# Patient Record
Sex: MALE | Race: WHITE | Employment: FULL TIME | URBAN - METROPOLITAN AREA
[De-identification: names, ages, dates, MRNs, and addresses within clinical notes are randomized per-mention and may not be internally consistent; named-entity substitution may affect disease eponyms.]

---

## 2018-11-13 ENCOUNTER — HOSPITAL ENCOUNTER (EMERGENCY)
Age: 38
Discharge: HOME | End: 2018-11-13
Payer: COMMERCIAL

## 2018-11-13 VITALS
DIASTOLIC BLOOD PRESSURE: 82 MMHG | HEART RATE: 77 BPM | BODY MASS INDEX: 31.5 KG/M2 | HEIGHT: 70 IN | SYSTOLIC BLOOD PRESSURE: 130 MMHG | TEMPERATURE: 98.7 F | WEIGHT: 220 LBS | RESPIRATION RATE: 17 BRPM | OXYGEN SATURATION: 97 %

## 2018-11-13 DIAGNOSIS — K64.4 EXTERNAL HEMORRHOID: Primary | ICD-10-CM

## 2018-11-13 PROCEDURE — 99282 EMERGENCY DEPT VISIT SF MDM: CPT

## 2018-11-13 RX ORDER — DOCUSATE SODIUM 100 MG/1
100 CAPSULE, LIQUID FILLED ORAL 2 TIMES DAILY
Qty: 30 CAPSULE | Refills: 0 | Status: SHIPPED | OUTPATIENT
Start: 2018-11-13 | End: 2020-03-04

## 2018-11-13 ASSESSMENT — PAIN DESCRIPTION - LOCATION: LOCATION: RECTUM

## 2018-11-13 ASSESSMENT — PAIN DESCRIPTION - PAIN TYPE: TYPE: ACUTE PAIN

## 2018-11-13 ASSESSMENT — ENCOUNTER SYMPTOMS
COUGH: 0
SHORTNESS OF BREATH: 0
ANAL BLEEDING: 1

## 2018-11-13 ASSESSMENT — PAIN SCALES - GENERAL: PAINLEVEL_OUTOF10: 6

## 2018-11-13 ASSESSMENT — PAIN DESCRIPTION - DESCRIPTORS: DESCRIPTORS: CONSTANT;THROBBING

## 2018-11-13 NOTE — ED PROVIDER NOTES
[facility]  eMERGENCY dEPARTMENT eNCOUnter      Pt Name: Mary García XHY:[id number]  [place] 1980  Date of evaluation: 11/13/2018  Provider: REED Fonseca CNP      Chief Complaint:    Chief Complaint   Patient presents with    Hemorrhoids     Pt  c/o pain and bleeding from the rectum that started on Saturday. Pt states \"I feel feel something on my rectum and I think its a hemorroid. \"       Nursing Notes, Past Medical Hx, Past Surgical Hx, Social Hx, Allergies, and Family Hx were all reviewed and agreed with or any disagreements were addressed in the HPI.    HPI:  (Location, Duration, Timing, Severity,Quality, Assoc Sx, Context, Modifying factors)  This is a  41 y.o. male who presents to the emergency department with complaints of rectal pain after bowel movement on Saturday. Since that time he has had a tender lump to his rectum. He reports that there is bright red blood on the toilet tissue after he wipes. He does have increased pain with bowel movements. He denies any constipation or diarrhea. He denies any abdominal pain. No fevers or chills. PastMedical/Surgical History:      Diagnosis Date    Kidney stones      History reviewed. No pertinent surgical history. Medications:  Previous Medications    METHADONE 10 MG/5ML SOLUTION    Take 90 mg by mouth once. Review of Systems:  Review of Systems   Constitutional: Negative for chills and fever. HENT: Negative. Respiratory: Negative for cough and shortness of breath. Cardiovascular: Negative for chest pain. Gastrointestinal: Positive for anal bleeding. Genitourinary: Negative. Musculoskeletal: Negative. Neurological: Negative. Psychiatric/Behavioral: Negative. Positives and Pertinent negatives as per HPI. Except as noted above in the ROS, problem specific ROS was completed and is negative.     Physical Exam:  Physical Exam

## 2019-08-07 ENCOUNTER — HOSPITAL ENCOUNTER (OUTPATIENT)
Dept: ULTRASOUND IMAGING | Age: 39
Discharge: HOME | End: 2019-08-07
Payer: COMMERCIAL

## 2019-08-07 DIAGNOSIS — B19.20 HEPATITIS C VIRUS INFECTION WITHOUT HEPATIC COMA, UNSPECIFIED CHRONICITY: ICD-10-CM

## 2019-08-07 PROCEDURE — 76981 USE PARENCHYMA: CPT

## 2019-08-07 PROCEDURE — 76705 ECHO EXAM OF ABDOMEN: CPT

## 2019-09-06 NOTE — PROGRESS NOTES
[address] [facility]   Cardiovascular Evaluation    PATIENT: Alecia Camacho  DATE: 2019  MRN: [id number]  CSN: [id number]  : 1980      Primary Care Doctor: No primary care provider on file. Reason for evaluation:   New Patient; Abnormal Test Results (ekg); and Numbness (both hands)      Subjective:   History of present illness on initial date of evaluation:   Alecia Camacho is a 42 y.o. patient who presents referred by Dr. Carlos Huang for abnormal EKG. He states he went to see his doctor a month ago due to hands/arms becoming numbs and feeling \"off\". He denies any family history of cardiac disease. He states he has never had any cardiac issues. Patient denies chest pain, sob, palpitations, dizziness or syncope. Patient Active Problem List   Diagnosis    Abnormal EKG         Past Medical History:   has a past medical history of Kidney stones. Surgical History:   has no past surgical history on file. Social History:   reports that he has been smoking cigarettes. He has been smoking about 1.00 pack per day. His smokeless tobacco use includes chew. He reports that he does not drink alcohol or use drugs. Family History:  No evidence for sudden cardiac death or premature CAD    Home Medications:  Reviewed and are listed in nursing record. and/or listed below  Current Outpatient Medications   Medication Sig Dispense Refill    methadone 10 MG/5ML solution Take 90 mg by mouth once.  hydrocortisone (ANUSOL-HC) 2.5 % rectal cream Place rectally 2 times daily. (Patient not taking: Reported on 2019) 1 Tube 0    docusate sodium (COLACE) 100 MG capsule Take 1 capsule by mouth 2 times daily (Patient not taking: Reported on 2019) 30 capsule 0     No current facility-administered medications for this visit. Allergies:  Patient has no known allergies. Review of Systems:   A 14 point review of symptoms completed.  Pertinent positives identified in the HPI, all 2014    AST 37 2014    ALKPHOS 83 2014    BILITOT 0.5 2014     No results found for: LABA1C  Lipids:       No results found for: TRIG       No results found for: HDL       No results found for: LDLCALC       No results found for: LABVLDL      CARDIAC DATA   EK2019 Sinus bradycardia at 56    ECHO/MUGA:    STRESS TEST:    CARDIAC CATH:    VASCULAR/OTHER IMAGING:      Assessment and Plan   Charity Jacobsen is a 42 y.o. male who presents today for the following problems:      1. Abnormal ECG: reason for referral    MD Plan:  1. PT ECG is normal except for sinus shane which is likely physiologic for pt. No s/s of chronotropic incompetence. No FMHx. - no further testing, no cardiac symptoms. - pt to call with any change in symptoms         Patient Active Problem List   Diagnosis    Abnormal EKG       Patient Plan:  1. No cardiac testing warranted        It is a pleasure to assist in the care of Charity Jacobsen. Please call with any questions. This note was scribed in the presence of Hardeep Arguello MD by Halle Villalobos RN.  Kenyetta Méndez MD, personally performed the services described in this documentation as scribed by the above signed scribe in my presence, and it is both accurate and complete to the best of our ability and knowledge. I agree with the details independently gathered by my clinical support staff, while the remaining scribed note accurately describes my personal service to the patient. The above RN is working as a scribe for and in the presence of myself . Working as a scribe, the RN may have prepopulated components of this note with my historical intellectual property under my direct supervision. Any additions to this intellectual property were performed at my direction. Furthermore, the content and accuracy of this note have been reviewed by me to the best of my ability.        Olga Calderon MD, [address] [facility] Cardiologist  [facility]  ([phone]

## 2019-09-09 ENCOUNTER — OFFICE VISIT (OUTPATIENT)
Dept: CARDIOLOGY CLINIC | Age: 39
End: 2019-09-09
Payer: COMMERCIAL

## 2019-09-09 VITALS
OXYGEN SATURATION: 96 % | DIASTOLIC BLOOD PRESSURE: 80 MMHG | BODY MASS INDEX: 31.92 KG/M2 | HEART RATE: 66 BPM | HEIGHT: 70 IN | SYSTOLIC BLOOD PRESSURE: 100 MMHG | WEIGHT: 223 LBS

## 2019-09-09 DIAGNOSIS — R94.31 ABNORMAL EKG: ICD-10-CM

## 2019-09-09 DIAGNOSIS — Z76.89 ESTABLISHING CARE WITH NEW DOCTOR, ENCOUNTER FOR: Primary | ICD-10-CM

## 2019-09-09 PROCEDURE — 93000 ELECTROCARDIOGRAM COMPLETE: CPT | Performed by: INTERNAL MEDICINE

## 2019-09-09 PROCEDURE — 99243 OFF/OP CNSLTJ NEW/EST LOW 30: CPT | Performed by: INTERNAL MEDICINE

## 2020-03-04 ENCOUNTER — HOSPITAL ENCOUNTER (EMERGENCY)
Age: 40
Discharge: HOME | End: 2020-03-04
Payer: COMMERCIAL

## 2020-03-04 ENCOUNTER — APPOINTMENT (OUTPATIENT)
Dept: CT IMAGING | Age: 40
End: 2020-03-04
Payer: COMMERCIAL

## 2020-03-04 VITALS
HEART RATE: 65 BPM | DIASTOLIC BLOOD PRESSURE: 77 MMHG | SYSTOLIC BLOOD PRESSURE: 123 MMHG | RESPIRATION RATE: 18 BRPM | OXYGEN SATURATION: 99 % | TEMPERATURE: 97.7 F | WEIGHT: 220 LBS | HEIGHT: 69 IN | BODY MASS INDEX: 32.58 KG/M2

## 2020-03-04 LAB
A/G RATIO: 1 (ref 1.1–2.2)
ALBUMIN SERPL-MCNC: 3.6 G/DL (ref 3.4–5)
ALP BLD-CCNC: 143 U/L (ref 40–129)
ALT SERPL-CCNC: 69 U/L (ref 10–40)
ANION GAP SERPL CALCULATED.3IONS-SCNC: 9 MMOL/L (ref 3–16)
AST SERPL-CCNC: 76 U/L (ref 15–37)
BASOPHILS ABSOLUTE: 0.1 K/UL (ref 0–0.2)
BASOPHILS RELATIVE PERCENT: 0.7 %
BILIRUB SERPL-MCNC: 0.5 MG/DL (ref 0–1)
BILIRUBIN URINE: NEGATIVE
BLOOD, URINE: NEGATIVE
BUN BLDV-MCNC: 15 MG/DL (ref 7–20)
CALCIUM SERPL-MCNC: 8.5 MG/DL (ref 8.3–10.6)
CHLORIDE BLD-SCNC: 104 MMOL/L (ref 99–110)
CLARITY: CLEAR
CO2: 23 MMOL/L (ref 21–32)
COLOR: YELLOW
CREAT SERPL-MCNC: 0.7 MG/DL (ref 0.9–1.3)
EOSINOPHILS ABSOLUTE: 0 K/UL (ref 0–0.6)
EOSINOPHILS RELATIVE PERCENT: 0.3 %
GFR AFRICAN AMERICAN: >60
GFR NON-AFRICAN AMERICAN: >60
GLOBULIN: 3.6 G/DL
GLUCOSE BLD-MCNC: 101 MG/DL (ref 70–99)
GLUCOSE URINE: NEGATIVE MG/DL
HCT VFR BLD CALC: 44.6 % (ref 40.5–52.5)
HEMOGLOBIN: 14.7 G/DL (ref 13.5–17.5)
KETONES, URINE: NEGATIVE MG/DL
LEUKOCYTE ESTERASE, URINE: NEGATIVE
LIPASE: 29 U/L (ref 13–60)
LYMPHOCYTES ABSOLUTE: 1 K/UL (ref 1–5.1)
LYMPHOCYTES RELATIVE PERCENT: 10.9 %
MCH RBC QN AUTO: 30.3 PG (ref 26–34)
MCHC RBC AUTO-ENTMCNC: 33.1 G/DL (ref 31–36)
MCV RBC AUTO: 91.6 FL (ref 80–100)
MICROSCOPIC EXAMINATION: ABNORMAL
MONOCYTES ABSOLUTE: 0.4 K/UL (ref 0–1.3)
MONOCYTES RELATIVE PERCENT: 4.5 %
NEUTROPHILS ABSOLUTE: 7.6 K/UL (ref 1.7–7.7)
NEUTROPHILS RELATIVE PERCENT: 83.6 %
NITRITE, URINE: NEGATIVE
PDW BLD-RTO: 14 % (ref 12.4–15.4)
PH UA: 8.5 (ref 5–8)
PLATELET # BLD: 140 K/UL (ref 135–450)
PMV BLD AUTO: 8.8 FL (ref 5–10.5)
POTASSIUM REFLEX MAGNESIUM: 4.2 MMOL/L (ref 3.5–5.1)
PROTEIN UA: NEGATIVE MG/DL
RBC # BLD: 4.86 M/UL (ref 4.2–5.9)
SODIUM BLD-SCNC: 136 MMOL/L (ref 136–145)
SPECIFIC GRAVITY UA: 1.02 (ref 1–1.03)
TOTAL PROTEIN: 7.2 G/DL (ref 6.4–8.2)
URINE REFLEX TO CULTURE: ABNORMAL
URINE TYPE: ABNORMAL
UROBILINOGEN, URINE: 1 E.U./DL
WBC # BLD: 9 K/UL (ref 4–11)

## 2020-03-04 PROCEDURE — 80053 COMPREHEN METABOLIC PANEL: CPT

## 2020-03-04 PROCEDURE — 96375 TX/PRO/DX INJ NEW DRUG ADDON: CPT

## 2020-03-04 PROCEDURE — 85025 COMPLETE CBC W/AUTO DIFF WBC: CPT

## 2020-03-04 PROCEDURE — 99284 EMERGENCY DEPT VISIT MOD MDM: CPT

## 2020-03-04 PROCEDURE — 81003 URINALYSIS AUTO W/O SCOPE: CPT

## 2020-03-04 PROCEDURE — 96374 THER/PROPH/DIAG INJ IV PUSH: CPT

## 2020-03-04 PROCEDURE — 6370000000 HC RX 637 (ALT 250 FOR IP): Performed by: PHYSICIAN ASSISTANT

## 2020-03-04 PROCEDURE — 6360000002 HC RX W HCPCS: Performed by: PHYSICIAN ASSISTANT

## 2020-03-04 PROCEDURE — 2500000003 HC RX 250 WO HCPCS: Performed by: PHYSICIAN ASSISTANT

## 2020-03-04 PROCEDURE — 83690 ASSAY OF LIPASE: CPT

## 2020-03-04 PROCEDURE — 2580000003 HC RX 258: Performed by: PHYSICIAN ASSISTANT

## 2020-03-04 RX ORDER — ONDANSETRON 2 MG/ML
4 INJECTION INTRAMUSCULAR; INTRAVENOUS ONCE
Status: COMPLETED | OUTPATIENT
Start: 2020-03-04 | End: 2020-03-04

## 2020-03-04 RX ORDER — ONDANSETRON 4 MG/1
4 TABLET, ORALLY DISINTEGRATING ORAL EVERY 8 HOURS PRN
Qty: 20 TABLET | Refills: 0 | Status: SHIPPED | OUTPATIENT
Start: 2020-03-04 | End: 2021-08-06

## 2020-03-04 RX ORDER — 0.9 % SODIUM CHLORIDE 0.9 %
1000 INTRAVENOUS SOLUTION INTRAVENOUS ONCE
Status: COMPLETED | OUTPATIENT
Start: 2020-03-04 | End: 2020-03-04

## 2020-03-04 RX ORDER — ACETAMINOPHEN 500 MG
1000 TABLET ORAL ONCE
Status: COMPLETED | OUTPATIENT
Start: 2020-03-04 | End: 2020-03-04

## 2020-03-04 RX ADMIN — FAMOTIDINE 20 MG: 10 INJECTION INTRAVENOUS at 12:19

## 2020-03-04 RX ADMIN — SODIUM CHLORIDE 1000 ML: 9 INJECTION, SOLUTION INTRAVENOUS at 12:17

## 2020-03-04 RX ADMIN — ONDANSETRON HYDROCHLORIDE 4 MG: 2 INJECTION, SOLUTION INTRAMUSCULAR; INTRAVENOUS at 12:17

## 2020-03-04 RX ADMIN — ACETAMINOPHEN 1000 MG: 500 TABLET ORAL at 13:01

## 2020-03-04 ASSESSMENT — PAIN SCALES - GENERAL: PAINLEVEL_OUTOF10: 8

## 2020-03-10 ASSESSMENT — ENCOUNTER SYMPTOMS
DIARRHEA: 1
COUGH: 0
VOMITING: 1
SHORTNESS OF BREATH: 0
NAUSEA: 1

## 2020-03-11 NOTE — ED PROVIDER NOTES
[address] [facility]  EMERGENCY DEPARTMENT ENCOUNTER        Pt Name: Yuliana Olivarez  MRN: [id number]  [place] 1980  Date of evaluation: 3/4/2020  Provider: Reji Burt PA-C  PCP: No primary care provider on file. ED Attending: No att. providers found      This patient was not seen and evaluated by the attending physician No att. providers found. I have independently evaluated this patient. CHIEF COMPLAINT       Chief Complaint   Patient presents with    Nausea     Patient arrived via EMS, c/o N/V since 8 am. denies fever    Emesis       HISTORY OF PRESENT ILLNESS   (Location/Symptom, Timing/Onset, Context/Setting, Quality, Duration, Modifying Factors, Severity)  Note limiting factors. Yuliana Olivarez is a 42 y.o. male for evaluation via EMS of sudden onset of nausea and vomiting and diarrhea at 8 AM this morning patient denies any fever. Denies any abdominal pain. Patient is negative for sick contacts. Patient denies any history of abdominal surgery. He has a history of kidney stones. He denies any hematuria. Patient denies any treatment prior to arrival. +fighiting dental infection. Taking abx for this. Been on abx for 3 days. 8/10 throbbing dental pain. Nursing Notes were all reviewed and agreed with or any disagreements were addressed  in the HPI. REVIEW OF SYSTEMS  (2-9 systems for level 4, 10 or more for level 5)     Review of Systems   Constitutional: Negative for chills and fever. Respiratory: Negative for cough and shortness of breath. Cardiovascular: Negative for chest pain and palpitations. Gastrointestinal: Positive for diarrhea, nausea and vomiting. Genitourinary: Negative for difficulty urinating, dysuria and frequency. Musculoskeletal: Negative. Skin: Negative. All other systems reviewed and are negative. Positivesand Pertinent negatives as per HPI.   Except as noted above in the ROS, all other systems were reviewed and negative. PAST MEDICAL HISTORY     Past Medical History:   Diagnosis Date    Kidney stones          SURGICAL HISTORY     No past surgical history on file. CURRENT MEDICATIONS       Discharge Medication List as of 3/4/2020  1:16 PM            ALLERGIES     Patient has no known allergies. FAMILY HISTORY     No family history on file.       SOCIAL HISTORY       Social History     Socioeconomic History    Marital status: Legally      Spouse name: Not on file    Number of children: Not on file    Years of education: Not on file    Highest education level: Not on file   Occupational History    Not on file   Social Needs    Financial resource strain: Not on file    Food insecurity     Worry: Not on file     Inability: Not on file    Transportation needs     Medical: Not on file     Non-medical: Not on file   Tobacco Use    Smoking status: Current Every Day Smoker     Packs/day: 1.00     Types: Cigarettes    Smokeless tobacco: Current User     Types: Chew   Substance and Sexual Activity    Alcohol use: No    Drug use: No     Types: IV     Comment: heroin last used 4/28/15    Sexual activity: Not on file   Lifestyle    Physical activity     Days per week: Not on file     Minutes per session: Not on file    Stress: Not on file   Relationships    Social connections     Talks on phone: Not on file     Gets together: Not on file     Attends Jewish service: Not on file     Active member of club or organization: Not on file     Attends meetings of clubs or organizations: Not on file     Relationship status: Not on file    Intimate partner violence     Fear of current or ex partner: Not on file     Emotionally abused: Not on file     Physically abused: Not on file     Forced sexual activity: Not on file   Other Topics Concern    Not on file   Social History Narrative    Not on file       SCREENINGS     NIH Score       Glascow Karo Coma Scale  Eye Opening: Spontaneous  Best Verbal Response: Oriented  Best Motor Response: Obeys commands  [place] Coma Scale Score: 15    [facility]     Heart Score         PHYSICAL EXAM    (up to 7 for level 4, 8 ormore for level 5)     ED Triage Vitals [03/04/20 1109]   BP Temp Temp Source Pulse Resp SpO2 Height Weight   127/70 97.7 °F (36.5 °C) Oral 54 16 100 % 5' 9\" (1.753 m) 220 lb (99.8 kg)       Physical Exam  Vitals signs and nursing note reviewed. Constitutional:       Appearance: He is well-developed. He is not diaphoretic. HENT:      Head: Normocephalic. Nose: Nose normal.      Mouth/Throat:      Pharynx: No oropharyngeal exudate. Eyes:      General:         Right eye: No discharge. Left eye: No discharge. Conjunctiva/sclera: Conjunctivae normal.      Pupils: Pupils are equal, round, and reactive to light. Neck:      Musculoskeletal: Normal range of motion. Cardiovascular:      Rate and Rhythm: Normal rate and regular rhythm. Heart sounds: Normal heart sounds. No murmur. No friction rub. No gallop. Pulmonary:      Effort: Pulmonary effort is normal. No respiratory distress. Breath sounds: Normal breath sounds. No wheezing. Abdominal:      General: Bowel sounds are normal. There is no distension. Palpations: Abdomen is soft. Tenderness: There is no abdominal tenderness. Musculoskeletal: Normal range of motion. Skin:     General: Skin is warm and dry. Neurological:      Mental Status: He is alert.    Psychiatric:         Behavior: Behavior normal.         DIAGNOSTIC RESULTS   LABS:    Labs Reviewed   COMPREHENSIVE METABOLIC PANEL W/ REFLEX TO MG FOR LOW K - Abnormal; Notable for the following components:       Result Value    Glucose 101 (*)     CREATININE 0.7 (*)     Albumin/Globulin Ratio 1.0 (*)     Alkaline Phosphatase 143 (*)     ALT 69 (*)     AST 76 (*)     All other components within normal limits    Narrative:     Performed at:  [facility] BUN 15 7 - 20 mg/dL    CREATININE 0.7 (L) 0.9 - 1.3 mg/dL    GFR Non-African American >60 >60    GFR African American >60 >60    Calcium 8.5 8.3 - 10.6 mg/dL    Total Protein 7.2 6.4 - 8.2 g/dL    Alb 3.6 3.4 - 5.0 g/dL    Albumin/Globulin Ratio 1.0 (L) 1.1 - 2.2    Total Bilirubin 0.5 0.0 - 1.0 mg/dL    Alkaline Phosphatase 143 (H) 40 - 129 U/L    ALT 69 (H) 10 - 40 U/L    AST 76 (H) 15 - 37 U/L    Globulin 3.6 g/dL   Lipase   Result Value Ref Range    Lipase 29.0 13.0 - 60.0 U/L   Urinalysis Reflex to Culture   Result Value Ref Range    Color, UA Yellow Straw/Yellow    Clarity, UA Clear Clear    Glucose, Ur Negative Negative mg/dL    Bilirubin Urine Negative Negative    Ketones, Urine Negative Negative mg/dL    Specific Gravity, UA 1.020 1.005 - 1.030    Blood, Urine Negative Negative    pH, UA 8.5 (A) 5.0 - 8.0    Protein, UA Negative Negative mg/dL    Urobilinogen, Urine 1.0 <2.0 E.U./dL    Nitrite, Urine Negative Negative    Leukocyte Esterase, Urine Negative Negative    Microscopic Examination Not Indicated     Urine Type NotGiven     Urine Reflex to Culture Not Indicated          I estimate there is LOW risk for ACUTE APPENDICITIS, BOWEL OBSTRUCTION, CHOLECYSTITIS, DIVERTICULITIS, INCARCERATED HERNIA, PANCREATITIS, or PERFORATED BOWEL or ULCER, thus I consider the discharge disposition reasonable. Also, there is no evidence or peritonitis, sepsis, or toxicity. Karey Madrigal and I have discussed the diagnosis and risks, and we agree with discharging home to follow-up with their primary doctor. We also discussed returning to the Emergency Department immediately if new or worsening symptoms occur. We have discussed the symptoms which are most concerning (e.g., bloody stool, fever, changing or worsening pain, vomiting) that necessitate immediate return. FINAL IMPRESSION      1. Nausea and vomiting, intractability of vomiting not specified, unspecified vomiting type    2.  Elevated liver enzymes DISPOSITION/PLAN   DISPOSITION Decision To Discharge 03/04/2020 01:15:45 PM      PATIENT REFERRED TO:  No follow-up provider specified.     DISCHARGE MEDICATIONS:  Discharge Medication List as of 3/4/2020  1:16 PM      START taking these medications    Details   ondansetron (ZOFRAN ODT) 4 MG disintegrating tablet Take 1 tablet by mouth every 8 hours as needed for Nausea, Disp-20 tablet, R-0Print             DISCONTINUED MEDICATIONS:  Discharge Medication List as of 3/4/2020  1:16 PM      STOP taking these medications       methadone 10 MG/5ML solution Comments:   Reason for Stopping:         hydrocortisone (ANUSOL-HC) 2.5 % rectal cream Comments:   Reason for Stopping:         docusate sodium (COLACE) 100 MG capsule Comments:   Reason for Stopping:                      (Please note that portions of this note were completed with a voice recognition program.  Efforts were made to edit the dictations but occasionally words are mis-transcribed.)    Jose Carson PA-C (electronically signed)        Jose Carson PA-C  03/10/20 [id number]

## 2020-07-08 ENCOUNTER — OFFICE VISIT (OUTPATIENT)
Dept: PRIMARY CARE CLINIC | Age: 40
End: 2020-07-08
Payer: COMMERCIAL

## 2020-07-08 PROCEDURE — 99211 OFF/OP EST MAY X REQ PHY/QHP: CPT | Performed by: NURSE PRACTITIONER

## 2020-07-08 NOTE — PROGRESS NOTES
Unique Ceron received a viral test for COVID-19. They were educated on isolation and quarantine as appropriate. For any symptoms, they were directed to seek care from their PCP, given contact information to establish with a doctor, directed to an urgent care or the emergency room.

## 2020-07-09 ENCOUNTER — OFFICE VISIT (OUTPATIENT)
Dept: PRIMARY CARE CLINIC | Age: 40
End: 2020-07-09
Payer: COMMERCIAL

## 2020-07-09 PROCEDURE — 99211 OFF/OP EST MAY X REQ PHY/QHP: CPT | Performed by: NURSE PRACTITIONER

## 2020-07-09 NOTE — PROGRESS NOTES
Isabela Barnes received a viral test for COVID-19. They were educated on isolation and quarantine as appropriate. For any symptoms, they were directed to seek care from their PCP, given contact information to establish with a doctor, directed to an urgent care or the emergency room.

## 2020-07-10 LAB
SARS-COV-2: NOT DETECTED
SOURCE: NORMAL

## 2020-07-13 LAB
SARS-COV-2: NOT DETECTED
SOURCE: NORMAL

## 2021-08-06 ENCOUNTER — HOSPITAL ENCOUNTER (INPATIENT)
Age: 41
LOS: 1 days | Discharge: LEFT BEFORE DISCHARGE | DRG: 549 | End: 2021-08-06
Attending: EMERGENCY MEDICINE | Admitting: INTERNAL MEDICINE
Payer: COMMERCIAL

## 2021-08-06 ENCOUNTER — APPOINTMENT (OUTPATIENT)
Dept: CT IMAGING | Age: 41
DRG: 549 | End: 2021-08-06
Payer: COMMERCIAL

## 2021-08-06 VITALS
HEIGHT: 68 IN | WEIGHT: 167.6 LBS | OXYGEN SATURATION: 94 % | BODY MASS INDEX: 25.4 KG/M2 | RESPIRATION RATE: 16 BRPM | TEMPERATURE: 97.6 F | HEART RATE: 70 BPM | DIASTOLIC BLOOD PRESSURE: 58 MMHG | SYSTOLIC BLOOD PRESSURE: 100 MMHG

## 2021-08-06 DIAGNOSIS — M00.9 SEPTIC ARTHRITIS, DUE TO UNSPECIFIED ORGANISM, SEPTIC ARTHRITIS OF UNSPECIFIED LOCATION (HCC): Primary | ICD-10-CM

## 2021-08-06 DIAGNOSIS — M89.8X1 PAIN OF LEFT CLAVICLE: ICD-10-CM

## 2021-08-06 LAB
A/G RATIO: 0.9 (ref 1.1–2.2)
ALBUMIN SERPL-MCNC: 4.1 G/DL (ref 3.4–5)
ALP BLD-CCNC: 127 U/L (ref 40–129)
ALT SERPL-CCNC: 25 U/L (ref 10–40)
ANION GAP SERPL CALCULATED.3IONS-SCNC: 14 MMOL/L (ref 3–16)
AST SERPL-CCNC: 26 U/L (ref 15–37)
BASOPHILS ABSOLUTE: 0 K/UL (ref 0–0.2)
BASOPHILS RELATIVE PERCENT: 0.4 %
BILIRUB SERPL-MCNC: 0.5 MG/DL (ref 0–1)
BUN BLDV-MCNC: 11 MG/DL (ref 7–20)
CALCIUM SERPL-MCNC: 10.2 MG/DL (ref 8.3–10.6)
CHLORIDE BLD-SCNC: 96 MMOL/L (ref 99–110)
CO2: 28 MMOL/L (ref 21–32)
CREAT SERPL-MCNC: 0.8 MG/DL (ref 0.9–1.3)
EKG ATRIAL RATE: 74 BPM
EKG DIAGNOSIS: NORMAL
EKG P AXIS: 66 DEGREES
EKG P-R INTERVAL: 142 MS
EKG Q-T INTERVAL: 416 MS
EKG QRS DURATION: 88 MS
EKG QTC CALCULATION (BAZETT): 461 MS
EKG R AXIS: 49 DEGREES
EKG T AXIS: 35 DEGREES
EKG VENTRICULAR RATE: 74 BPM
EOSINOPHILS ABSOLUTE: 0.1 K/UL (ref 0–0.6)
EOSINOPHILS RELATIVE PERCENT: 0.9 %
GFR AFRICAN AMERICAN: >60
GFR NON-AFRICAN AMERICAN: >60
GLOBULIN: 4.8 G/DL
GLUCOSE BLD-MCNC: 101 MG/DL (ref 70–99)
HCT VFR BLD CALC: 35.8 % (ref 40.5–52.5)
HEMOGLOBIN: 12.4 G/DL (ref 13.5–17.5)
INR BLD: 1.46 (ref 0.88–1.12)
LACTIC ACID: 0.7 MMOL/L (ref 0.4–2)
LYMPHOCYTES ABSOLUTE: 2.5 K/UL (ref 1–5.1)
LYMPHOCYTES RELATIVE PERCENT: 27 %
MCH RBC QN AUTO: 29.5 PG (ref 26–34)
MCHC RBC AUTO-ENTMCNC: 34.7 G/DL (ref 31–36)
MCV RBC AUTO: 85.1 FL (ref 80–100)
MONOCYTES ABSOLUTE: 0.8 K/UL (ref 0–1.3)
MONOCYTES RELATIVE PERCENT: 9.1 %
NEUTROPHILS ABSOLUTE: 5.7 K/UL (ref 1.7–7.7)
NEUTROPHILS RELATIVE PERCENT: 62.6 %
PDW BLD-RTO: 13.2 % (ref 12.4–15.4)
PLATELET # BLD: 269 K/UL (ref 135–450)
PMV BLD AUTO: 7.7 FL (ref 5–10.5)
POTASSIUM SERPL-SCNC: 3.9 MMOL/L (ref 3.5–5.1)
PROCALCITONIN: 0.13 NG/ML (ref 0–0.15)
PROTHROMBIN TIME: 16.8 SEC (ref 9.9–12.7)
RBC # BLD: 4.21 M/UL (ref 4.2–5.9)
SODIUM BLD-SCNC: 138 MMOL/L (ref 136–145)
TOTAL PROTEIN: 8.9 G/DL (ref 6.4–8.2)
TROPONIN: <0.01 NG/ML
WBC # BLD: 9.2 K/UL (ref 4–11)

## 2021-08-06 PROCEDURE — 6360000002 HC RX W HCPCS: Performed by: NURSE PRACTITIONER

## 2021-08-06 PROCEDURE — 93005 ELECTROCARDIOGRAM TRACING: CPT | Performed by: EMERGENCY MEDICINE

## 2021-08-06 PROCEDURE — 84484 ASSAY OF TROPONIN QUANT: CPT

## 2021-08-06 PROCEDURE — 99285 EMERGENCY DEPT VISIT HI MDM: CPT

## 2021-08-06 PROCEDURE — 85025 COMPLETE CBC W/AUTO DIFF WBC: CPT

## 2021-08-06 PROCEDURE — 87040 BLOOD CULTURE FOR BACTERIA: CPT

## 2021-08-06 PROCEDURE — 93010 ELECTROCARDIOGRAM REPORT: CPT | Performed by: INTERNAL MEDICINE

## 2021-08-06 PROCEDURE — 99254 IP/OBS CNSLTJ NEW/EST MOD 60: CPT | Performed by: NURSE PRACTITIONER

## 2021-08-06 PROCEDURE — 96374 THER/PROPH/DIAG INJ IV PUSH: CPT

## 2021-08-06 PROCEDURE — 2580000003 HC RX 258: Performed by: EMERGENCY MEDICINE

## 2021-08-06 PROCEDURE — 6360000002 HC RX W HCPCS: Performed by: EMERGENCY MEDICINE

## 2021-08-06 PROCEDURE — 2580000003 HC RX 258: Performed by: INTERNAL MEDICINE

## 2021-08-06 PROCEDURE — 85610 PROTHROMBIN TIME: CPT

## 2021-08-06 PROCEDURE — 6360000004 HC RX CONTRAST MEDICATION: Performed by: EMERGENCY MEDICINE

## 2021-08-06 PROCEDURE — 1200000000 HC SEMI PRIVATE

## 2021-08-06 PROCEDURE — 80053 COMPREHEN METABOLIC PANEL: CPT

## 2021-08-06 PROCEDURE — 96375 TX/PRO/DX INJ NEW DRUG ADDON: CPT

## 2021-08-06 PROCEDURE — 6370000000 HC RX 637 (ALT 250 FOR IP): Performed by: INTERNAL MEDICINE

## 2021-08-06 PROCEDURE — 83605 ASSAY OF LACTIC ACID: CPT

## 2021-08-06 PROCEDURE — 99254 IP/OBS CNSLTJ NEW/EST MOD 60: CPT | Performed by: INTERNAL MEDICINE

## 2021-08-06 PROCEDURE — 71260 CT THORAX DX C+: CPT

## 2021-08-06 PROCEDURE — 84145 PROCALCITONIN (PCT): CPT

## 2021-08-06 RX ORDER — ONDANSETRON 2 MG/ML
4 INJECTION INTRAMUSCULAR; INTRAVENOUS EVERY 6 HOURS PRN
Status: DISCONTINUED | OUTPATIENT
Start: 2021-08-06 | End: 2021-08-06 | Stop reason: HOSPADM

## 2021-08-06 RX ORDER — PROMETHAZINE HYDROCHLORIDE 25 MG/1
12.5 TABLET ORAL EVERY 6 HOURS PRN
Status: DISCONTINUED | OUTPATIENT
Start: 2021-08-06 | End: 2021-08-06 | Stop reason: HOSPADM

## 2021-08-06 RX ORDER — LORAZEPAM 2 MG/ML
0.5 INJECTION INTRAMUSCULAR ONCE
Status: COMPLETED | OUTPATIENT
Start: 2021-08-06 | End: 2021-08-06

## 2021-08-06 RX ORDER — POTASSIUM CHLORIDE 7.45 MG/ML
10 INJECTION INTRAVENOUS PRN
Status: DISCONTINUED | OUTPATIENT
Start: 2021-08-06 | End: 2021-08-06 | Stop reason: HOSPADM

## 2021-08-06 RX ORDER — OXYCODONE HYDROCHLORIDE 5 MG/1
10 TABLET ORAL EVERY 4 HOURS PRN
Status: DISCONTINUED | OUTPATIENT
Start: 2021-08-06 | End: 2021-08-06 | Stop reason: HOSPADM

## 2021-08-06 RX ORDER — KETOROLAC TROMETHAMINE 30 MG/ML
15 INJECTION, SOLUTION INTRAMUSCULAR; INTRAVENOUS ONCE
Status: COMPLETED | OUTPATIENT
Start: 2021-08-06 | End: 2021-08-06

## 2021-08-06 RX ORDER — ACETAMINOPHEN 650 MG/1
650 SUPPOSITORY RECTAL EVERY 6 HOURS PRN
Status: DISCONTINUED | OUTPATIENT
Start: 2021-08-06 | End: 2021-08-06 | Stop reason: HOSPADM

## 2021-08-06 RX ORDER — ONDANSETRON 2 MG/ML
4 INJECTION INTRAMUSCULAR; INTRAVENOUS ONCE
Status: COMPLETED | OUTPATIENT
Start: 2021-08-06 | End: 2021-08-06

## 2021-08-06 RX ORDER — OXYCODONE HYDROCHLORIDE 5 MG/1
5 TABLET ORAL EVERY 4 HOURS PRN
Status: DISCONTINUED | OUTPATIENT
Start: 2021-08-06 | End: 2021-08-06 | Stop reason: HOSPADM

## 2021-08-06 RX ORDER — ACETAMINOPHEN 325 MG/1
650 TABLET ORAL EVERY 6 HOURS PRN
Status: DISCONTINUED | OUTPATIENT
Start: 2021-08-06 | End: 2021-08-06 | Stop reason: HOSPADM

## 2021-08-06 RX ORDER — SODIUM CHLORIDE 9 MG/ML
25 INJECTION, SOLUTION INTRAVENOUS PRN
Status: DISCONTINUED | OUTPATIENT
Start: 2021-08-06 | End: 2021-08-06 | Stop reason: HOSPADM

## 2021-08-06 RX ORDER — SODIUM CHLORIDE 9 MG/ML
1000 INJECTION, SOLUTION INTRAVENOUS ONCE
Status: COMPLETED | OUTPATIENT
Start: 2021-08-06 | End: 2021-08-06

## 2021-08-06 RX ORDER — SODIUM CHLORIDE 0.9 % (FLUSH) 0.9 %
10 SYRINGE (ML) INJECTION EVERY 12 HOURS SCHEDULED
Status: DISCONTINUED | OUTPATIENT
Start: 2021-08-06 | End: 2021-08-06 | Stop reason: HOSPADM

## 2021-08-06 RX ORDER — SODIUM CHLORIDE 0.9 % (FLUSH) 0.9 %
10 SYRINGE (ML) INJECTION PRN
Status: DISCONTINUED | OUTPATIENT
Start: 2021-08-06 | End: 2021-08-06 | Stop reason: HOSPADM

## 2021-08-06 RX ORDER — MAGNESIUM SULFATE IN WATER 40 MG/ML
2000 INJECTION, SOLUTION INTRAVENOUS PRN
Status: DISCONTINUED | OUTPATIENT
Start: 2021-08-06 | End: 2021-08-06 | Stop reason: HOSPADM

## 2021-08-06 RX ADMIN — OXYCODONE 10 MG: 5 TABLET ORAL at 15:02

## 2021-08-06 RX ADMIN — IOPAMIDOL 75 ML: 755 INJECTION, SOLUTION INTRAVENOUS at 03:42

## 2021-08-06 RX ADMIN — ONDANSETRON 4 MG: 2 INJECTION INTRAMUSCULAR; INTRAVENOUS at 02:29

## 2021-08-06 RX ADMIN — KETOROLAC TROMETHAMINE 15 MG: 30 INJECTION, SOLUTION INTRAMUSCULAR at 02:29

## 2021-08-06 RX ADMIN — VANCOMYCIN HYDROCHLORIDE 1750 MG: 1 INJECTION, POWDER, LYOPHILIZED, FOR SOLUTION INTRAVENOUS at 06:39

## 2021-08-06 RX ADMIN — CEFEPIME HYDROCHLORIDE 2000 MG: 2 INJECTION, POWDER, FOR SOLUTION INTRAVENOUS at 06:02

## 2021-08-06 RX ADMIN — ENOXAPARIN SODIUM 40 MG: 40 INJECTION SUBCUTANEOUS at 15:03

## 2021-08-06 RX ADMIN — SODIUM CHLORIDE 1000 ML: 9 INJECTION, SOLUTION INTRAVENOUS at 05:57

## 2021-08-06 RX ADMIN — LORAZEPAM 0.5 MG: 2 INJECTION INTRAMUSCULAR; INTRAVENOUS at 03:08

## 2021-08-06 ASSESSMENT — ENCOUNTER SYMPTOMS
SHORTNESS OF BREATH: 0
COLOR CHANGE: 1
ABDOMINAL PAIN: 0
VOMITING: 0
DIARRHEA: 0
CHEST TIGHTNESS: 0
SORE THROAT: 0

## 2021-08-06 ASSESSMENT — PAIN DESCRIPTION - ORIENTATION: ORIENTATION: LEFT

## 2021-08-06 ASSESSMENT — PAIN DESCRIPTION - DESCRIPTORS: DESCRIPTORS: SORE

## 2021-08-06 ASSESSMENT — PAIN DESCRIPTION - FREQUENCY: FREQUENCY: CONTINUOUS

## 2021-08-06 ASSESSMENT — PAIN DESCRIPTION - PAIN TYPE
TYPE: ACUTE PAIN
TYPE: ACUTE PAIN

## 2021-08-06 ASSESSMENT — PAIN DESCRIPTION - LOCATION: LOCATION: CHEST

## 2021-08-06 ASSESSMENT — PAIN SCALES - GENERAL
PAINLEVEL_OUTOF10: 10
PAINLEVEL_OUTOF10: 9
PAINLEVEL_OUTOF10: 0
PAINLEVEL_OUTOF10: 7
PAINLEVEL_OUTOF10: 10

## 2021-08-06 NOTE — ED NOTES
Medicated after increased restlessness. After being medicated,.  To CT scan with no difficulties      Lenin Blanco RN  08/06/21 0979

## 2021-08-06 NOTE — ED NOTES
Bed: 17  Expected date:   Expected time:   Means of arrival:   Comments:  [address] Akil Hurt, RN  08/06/21 4617

## 2021-08-06 NOTE — H&P
Hospital Medicine History & Physical      PCP: No primary care provider on file. Date of Admission: 8/6/2021    Date of Service: Pt seen/examined on 8/6/2021 and Admitted to Inpatient with expected LOS greater than two midnights due to medical therapy. Chief Complaint:  Chest wall pain      History Of Present Illness:    46 y.o. male who presented to [facility] with pain/swelling of the left clavicular region. Patient has a history of IV drug abuse. He reportedly started with a methadone clinic last month. CT chest findings suggest septic arthritis of the left sternomanubrial joint without fracture or evidence for abscess formation. Blood cx's ordered, patient started on Vanc/Cefepime. CT surg/ID consulted. Past Medical History:          Diagnosis Date    Drug abuse ([facility] [address]     Hepatitis B     Hepatitis C     Kidney stones        Past Surgical History:      History reviewed. No pertinent surgical history. Medications Prior to Admission:      Prior to Admission medications    Not on File       Allergies:  Patient has no known allergies. Social History:      The patient currently lives at home    TOBACCO:   reports that he has been smoking cigarettes. He has been smoking about 1.00 pack per day. His smokeless tobacco use includes chew. ETOH:   reports no history of alcohol use. E-Cigarettes/Vaping Use     Questions Responses    E-Cigarette/Vaping Use     Start Date     Passive Exposure     Quit Date     Counseling Given     Comments             Family History:     Reviewed in detail and negative for DM, CAD, Cancer, CVA. Positive as follows:    History reviewed. No pertinent family history. REVIEW OF SYSTEMS COMPLETED:   Pertinent positives as noted in the HPI. All other systems reviewed and negative.     PHYSICAL EXAM PERFORMED:    /83   Pulse 69   Temp 97.4 °F (36.3 °C) (Oral)   Resp 16   Ht 5' 8\" (1.727 m)   Wt 167 lb 9.6 oz (76 kg)   SpO2 100%   BMI 25.48 kg/m² General appearance:  No apparent distress, appears stated age and cooperative. Appears drowsy, responds to verb stim. HEENT:  Normal cephalic, atraumatic without obvious deformity. Pupils equal, round, and reactive to light. Extra ocular muscles intact. Conjunctivae/corneas clear. Neck: Supple, with full range of motion. No jugular venous distention. Trachea midline. Respiratory:  Normal respiratory effort. Clear to auscultation, bilaterally without Rales/Wheezes/Rhonchi. Cardiovascular:  Regular rate and rhythm with normal S1/S2 without murmurs, rubs or gallops. Abdomen: Soft, non-tender, non-distended with normal bowel sounds. Musculoskeletal:  No clubbing, cyanosis or edema bilaterally. Full range of motion without deformity. Skin: Left clavicular pain/tenderness, no erythema  Neurologic:  Neurovascularly intact without any focal sensory/motor deficits.  Cranial nerves: II-XII intact, grossly non-focal.  Psychiatric:  Drowsy  Capillary Refill: Brisk,3 seconds, normal  Peripheral Pulses: +2 palpable, equal bilaterally       Labs:     Recent Labs     08/06/21 0225   WBC 9.2   HGB 12.4*   HCT 35.8*        Recent Labs     08/06/21 0225      K 3.9   CL 96*   CO2 28   BUN 11   CREATININE 0.8*   CALCIUM 10.2     Recent Labs     08/06/21 0225   AST 26   ALT 25   BILITOT 0.5   ALKPHOS 127     Recent Labs     08/06/21 0225   INR 1.46*     Recent Labs     08/06/21 0225   TROPONINI <0.01       Urinalysis:      Lab Results   Component Value Date    NITRU Negative 03/04/2020    WBCUA 0-2 07/29/2014    BACTERIA Present but obscured 03/15/2013    RBCUA 10-20 07/29/2014    BLOODU Negative 03/04/2020    SPECGRAV 1.020 03/04/2020    GLUCOSEU Negative 03/04/2020       Radiology:     EKG:  I have reviewed the EKG with the following interpretation: NSR    CT CHEST W CONTRAST   Final Result   Findings suggest septic arthritis of the left sternomanubrial joint without   fracture or evidence for abscess formation. ASSESSMENT:    Active Hospital Problems    Diagnosis Date Noted    Septic arthritis ([facility] [address] [M00.9] 08/06/2021         PLAN:    Septic Arthritis of left sternomanubrial joint   -Vanc/cefepime  -BC pending  -CT surg/ID consult appreciate recs  -Echo ordered  -HIV scrn  -monitor for opiate withdrawal    DVT Prophylaxis: lovenox  Diet: Diet NPO Exceptions are: Ice Chips, Sips of Water with Meds  Code Status: Full Code    Dispo - pending course       REED Lira - CNP    Thank you No primary care provider on file. for the opportunity to be involved in this patient's care. If you have any questions or concerns please feel free to contact me at [phone].

## 2021-08-06 NOTE — PROGRESS NOTES
CC:40 y/o male with h/o substance abuse who presents to the ED with left possile SCJ infection    HD Stable  WBC 9.2    Case discussed briefly with Dr Vizcaino Files from the ED over the phone. Chest CT with inflammatory changes of left SCJ was reviewed    Patient to be admitted to the hospital ist service for IV abx and further work up    CVTS to see patient in morning rounds.  Full consult to follow

## 2021-08-06 NOTE — CONSULTS
Infectious Diseases   Consult Note      Reason for Consult:  SCJ septic arthritis    Requesting Physician:  REED Dean       Date of Admission: 8/6/2021  Subjective:   CHIEF COMPLAINT:  None given     HPI:    Rosa Rivas is a 39yoM with history of polysubstance use disorder, last use was yesterday; nephrolithiasis                 ED 8/6/21 with 2 week history of swelling and pain at the sternal notch. Fever, chills denied. CT with evidence of L SCJ septic arthritis   BC ordered, in process     He is afebrile        Current abx:  None        Past Surgical History:       Diagnosis Date    Drug abuse ([facility] [address]     Hepatitis B     Hepatitis C     Kidney stones        Social History:    TOBACCO:   reports that he has been smoking cigarettes. He has been smoking about 1.00 pack per day. His smokeless tobacco use includes chew. ETOH:   reports no history of alcohol use. There is no history of illicit drug use or other significant epidemiologic exposures. Family History:   History reviewed. No pertinent family history. There is no family history of autoimmune diseases or significant infectious diseases.       Current Medications:    Current Facility-Administered Medications: sodium chloride flush 0.9 % injection 10 mL, 10 mL, Intravenous, 2 times per day  sodium chloride flush 0.9 % injection 10 mL, 10 mL, Intravenous, PRN  0.9 % sodium chloride infusion, 25 mL, Intravenous, PRN  potassium chloride 10 mEq/100 mL IVPB (Peripheral Line), 10 mEq, Intravenous, PRN  magnesium sulfate 2000 mg in 50 mL IVPB premix, 2,000 mg, Intravenous, PRN  promethazine (PHENERGAN) tablet 12.5 mg, 12.5 mg, Oral, Q6H PRN **OR** ondansetron (ZOFRAN) injection 4 mg, 4 mg, Intravenous, Q6H PRN  acetaminophen (TYLENOL) tablet 650 mg, 650 mg, Oral, Q6H PRN **OR** acetaminophen (TYLENOL) suppository 650 mg, 650 mg, Rectal, Q6H PRN  oxyCODONE (ROXICODONE) immediate release tablet 5 mg, 5 mg, Oral, Q4H PRN  oxyCODONE (ROXICODONE) immediate release tablet 10 mg, 10 mg, Oral, Q4H PRN  perflutren lipid microspheres (DEFINITY) injection 1.65 mg, 1.5 mL, Intravenous, ONCE PRN      No Known Allergies       REVIEW OF SYSTEMS:    CONSTITUTIONAL:  F/C/NS denied    EYES:  negative for blurred vision, eye discharge, visual disturbance and icterus  HEENT:  negative for hearing loss, tinnitus, ear drainage, sinus pressure, nasal congestion, epistaxis and snoring  RESPIRATORY:  No cough, shortness of breath, hemoptysis  CARDIOVASCULAR:  negative for palpitations, exertional chest pressure/discomfort, edema, syncope  GASTROINTESTINAL:  negative for nausea, vomiting, diarrhea, constipation, blood in stool and abdominal pain  GENITOURINARY:  negative for frequency, dysuria, urinary incontinence, decreased urine volume, and hematuria  HEMATOLOGIC/LYMPHATIC:  negative for easy bruising, bleeding and lymphadenopathy  ALLERGIC/IMMUNOLOGIC:  negative for recurrent infections, angioedema, anaphylaxis and drug reactions  ENDOCRINE:  negative for weight changes and diabetic symptoms including polyuria, polydipsia and polyphagia  MUSCULOSKELETAL:   Per HPI   No other focal joint pain reported   NEUROLOGICAL:  negative for headaches, slurred speech, unilateral weakness  PSYCHIATRIC/BEHAVIORAL: negative for hallucinations, behavioral problems, confusion and agitation. Objective:   PHYSICAL EXAM:      VITALS:  BP (!) 100/58   Pulse 70   Temp 97.6 °F (36.4 °C) (Oral)   Resp 16   Ht 5' 8\" (1.727 m)   Wt 167 lb 9.6 oz (76 kg)   SpO2 94%   BMI 25.48 kg/m²      24HR INTAKE/OUTPUT:  No intake or output data in the 24 hours ending 08/06/21 1322  CONSTITUTIONAL:  Awake, alert, cooperative, no apparent distress, and appears stated age  HEENT: NCAT, PERRL, EOMI. Sclera white, conjunctiva full.   OP with moist mucosal membranes, no thrush, tongue protrudes midline  NECK:  Supple, symmetrical, trachea midline, no adenopathy  LUNGS:  no increased work of breathing, CTA gamal without W/R/R  Fullness, faint erythema at L SCJ  CARDIOVASCULAR:   RRR without murmur  ABDOMEN:  normal bowel sounds, soft, flat, NT   PSYCHIATRIC: Oriented to person place and time. No obvious depression or anxiety. MUSCULOSKELETAL: No obvious misalignment or effusion of the joints. No clubbing, cyanosis of the digits. SKIN:  normal skin color, texture, turgor and no redness, warmth, or swelling. No palpable nodules or stigmata of embolic phenomenon  NEUROLOGIC: nonfocal exam  ACCESS:   IV site ok       DATA:    Old records have been reviewed    CBC:  Recent Labs     08/06/21 0225   WBC 9.2   RBC 4.21   HGB 12.4*   HCT 35.8*      MCV 85.1   MCH 29.5   MCHC 34.7   RDW 13.2      BMP:  Recent Labs     08/06/21 0225      K 3.9   CL 96*   CO2 28   BUN 11   CREATININE 0.8*   CALCIUM 10.2   GLUCOSE 101*        Cultures:   8/6 BC x2 in process      Radiology Review:  All pertinent images / reports were reviewed as a part of this visit. CT chest 8/6/21   Impression   Findings suggest septic arthritis of the left sternomanubrial joint without   fracture or evidence for abscess formation. Assessment:     Patient Active Problem List   Diagnosis    Abnormal EKG    Septic arthritis ([address]       Polysubstance abuse     Admitted with 2 week history of pain and inflammation at sternal notch  Local inflammation evident with CT suggesting septic L SCJ  BC are in process    NKDA       There does not appear to be fluid amenable to aspiration nor indication for debridement  This is most likely staphylococcal, SA accounts for about 50% of cases of infections of SCJ  Await BC result  Start empiric vanc, cefepime  HIV screen     Further recs to follow        Gilbert Harrell M.D. Thank you for the opportunity to participate in the care of your patient.     Please do not hesitate to contact me:   [phone] office

## 2021-08-06 NOTE — CONSULTS
Department of Cardiovascular & Thoracic Surgery  History and Physical          DIAGNOSIS: Left clavicle septic arthritis/questionable SCJ abscess     CHIEF COMPLAINT:    Chief Complaint   Patient presents with    Abscess       History Obtained From:  patient, electronic medical record    HISTORY OF PRESENT ILLNESS:      The patient is a 46 y.o. male current 1 PPD smoker with significant past medical history of polysubstance abuse (according to ED RN pt had used meth, ice and heroin this morning before coming to the ED), HCV and HBV who presented to [facility] ED on 8/6 with c/o increased pain and swelling in the left clavicular area. A CT Chest was performed and showed a questionable left SCJ abscess vs. Septic arthritis. He was admitted for further evaluation and treatment. We were consulted for possible surgical intervention. Past Medical History:        Diagnosis Date    Drug abuse ([address]     Hepatitis B     Hepatitis C     Kidney stones        Past Surgical History:    History reviewed. No pertinent surgical history. Medications Prior to Admission:   No medications prior to admission. Allergies:  Patient has no known allergies. Social History:    TOBACCO:   reports that he has been smoking cigarettes. He has been smoking about 1.00 pack per day. His smokeless tobacco use includes chew. ETOH:   reports no history of alcohol use. CAFFEINE ABUSE:  Yes  DRUGS:   reports current drug use. Drugs: IV, Cocaine, Marijuana, Methamphetamines, and Opiates . LIFESTYLE: unknown     MARITAL STATUS:    OCCUPATION:  Unknown      Family History:    History reviewed. No pertinent family history. REVIEW OF SYSTEMS:      Constitutional:  No night sweats, headaches, weight loss. Eyes:  No glaucoma, cataracts. ENMT:  No nosebleeds, deviated septum. Cardiac:  No arrhythmias, +chest wall pain. Vascular:  No claudication, varicosities. GI:  No PUD, heartburn.   :  No kidney stones, frequent UTIs  Musculoskeletal:  No arthritis, gout. +swelling to Left clavicular area  Respiratory:  No SOB, emphysema, asthma. Integumentary:  No dermatitis, itching, rash. Neurological:  No stroke, TIAs, seizures. Psychiatric:  No depression, anxiety. Endocrine: No diabetes, thyroid issues. Hematologic:  No bleeding, easy bruising. Immunologic:  No known cancer, steroid therapies. PHYSICAL EXAM:    VITALS:  BP (!) 100/58   Pulse 70   Temp 97.6 °F (36.4 °C) (Oral)   Resp 16   Ht 5' 8\" (1.727 m)   Wt 167 lb 9.6 oz (76 kg)   SpO2 94%   BMI 25.48 kg/m²     Constitutional:   Well developed and nourished male. No acute distress. No obesity. Eyes:  lids and lashes normal, pupils equal, round and reactive to light, extra ocular muscles intact, sclera clear, conjunctiva normal    Head/ENT:   normal teeth, gums, & palate. Moist mucus membranes. No cyanosis or pallor. Neck:  supple, symmetrical, trachea midline, no lymphadenopathy, no jugular venous distension, no carotid bruits and MASSES:  no masses. No thyromegaly. Lungs:  no increased work of breathing, good air exchange, no retractions and clear to auscultation, no crackles or wheezing. No tactile fremitus. Cardiovascular:  regular rate and rhythm, S1, S2 normal, no murmur, click, rub or gallop. Apical impulse in 5th intercostal space. Pulses:  Right dorsalis pedis 2, Left dorsalis pedis 2, Right posterior tibial 1, Left Posterior tibial 1, Right radial 2, and Left radial 2. Abdomen:  normal bowel sounds, non-tender, aorta normal and bruits absent. No hepatosplenomegaly or masses. Musculoskeletal:  Back is straight and non-tender, full ROM of upper and lower extremities. No kyphosis or scoliosis. +tenderness to MORIAH chest with palpation. Extremities:  Warm, pink, no clubbing, cyanosis, petechiae, ischemia, or deformities. No peripheral edema.      Skin: no rashes, no ecchymoses, no petechiae, no nodules, no jaundice    Neurological/Psychiatric: oriented, normal mood, CN II-XII intact    DATA:    EK21 Normal sinus rhythm Normal ECG When compared with ECG of 2005 21:24,ST no longer elevated in Anterior leads QT has lengthened     CBC with Differential:    Lab Results   Component Value Date    WBC 9.2 2021    RBC 4.21 2021    HGB 12.4 2021    HCT 35.8 2021     2021    MCV 85.1 2021    MCH 29.5 2021    MCHC 34.7 2021    RDW 13.2 2021    SEGSPCT 84.0 10/19/2012    BANDSPCT 6.0 10/19/2012    LYMPHOPCT 27.0 2021    MONOPCT 9.1 2021    BASOPCT 0.4 2021    MONOSABS 0.8 2021    LYMPHSABS 2.5 2021    EOSABS 0.1 2021    BASOSABS 0.0 2021    DIFFTYPE Manual 10/19/2012     CMP:    Lab Results   Component Value Date     2021    K 3.9 2021    K 4.2 2020    CL 96 2021    CO2 28 2021    BUN 11 2021    CREATININE 0.8 2021    GFRAA >60 2021    GFRAA >60 03/15/2013    AGRATIO 0.9 2021    LABGLOM >60 2021    GLUCOSE 101 2021    PROT 8.9 2021    PROT 8.0 10/19/2012    LABALBU 4.1 2021    CALCIUM 10.2 2021    BILITOT 0.5 2021    ALKPHOS 127 2021    AST 26 2021    ALT 25 2021     Hepatic Function Panel:    Lab Results   Component Value Date    ALKPHOS 127 2021    ALT 25 2021    AST 26 2021    PROT 8.9 2021    PROT 8.0 10/19/2012    BILITOT 0.5 2021    LABALBU 4.1 2021     Last 3 Troponin:    Lab Results   Component Value Date    TROPONINI <0.01 2021       ECHO: 21 pending     CT Chest: 21   Impression   Findings suggest septic arthritis of the left sternomanubrial joint without   fracture or evidence for abscess formation.        ASSESSMENT AND PLAN:     Pt is a 46 y.o. male with history of substance abuse (including IV drug use) that presented with a painful, swollen area above his left clavicle (not erythematous). CT Chest reviewed and it does not appear to show any pockets/abscesses that would be drainable. We have consulted Infectious disease for assistance with management. Echo has been ordered to look for s/s of endocarditis. Thank you for the consult. We will continue to follow. Echo. Charity Chan, APRN - CNP  8/6/2021  12:37 PM   Patient leaving AMA this evening. Note reviewed, events of last 24 hours reviewed along with vital signs and I/Os and patient examined. Assessment and plans discussed and are as outlined above.      Carly Owens MD, FACS, [facility], FACCP, [place]

## 2021-08-06 NOTE — ED TRIAGE NOTES
Presents via EMS after was found with decreased responsiveness. Per patient Donal Corpus is untrue\" States was seen here a few days ago left AMA for a left chest abscess and states it is worsening. Upon arrival awake alert noted restlessness. States has used [employer] and heroin today prior to arrival. Left anterior chest noted to have a protruding area over left clavicle and chest area. No warmth  No redness does not indent. C/O pain upon palpation. Respirations easy and unlabored. Patient now stating he was not seen but \"lied to EMS\"  When inquired about why states he did not know.

## 2021-08-06 NOTE — PROGRESS NOTES
Pt A&Ox4. VSS. Shift assessment completed. Denies pain at this time. Call light within reach, bed in lowest position, wheels locked. Bed alarm on and audible.

## 2021-08-06 NOTE — ED NOTES
Called Cardiothoracic Surgery @[phone]  RE: left clavicle septic arthritis Per Dr.Price Núñez Responded @ [phone]     [facility] [employer]  08/06/21 [id number]

## 2021-08-06 NOTE — ED NOTES
Report to receiving RN.  Patient resting with s/s of distress respirations easy and unlabored     Jessica Lee RN  08/06/21 3812

## 2021-08-06 NOTE — CARE COORDINATION
CASE MANAGEMENT INITIAL ASSESSMENT    Reviewed chart and completed assessment with: Patient     Explained Case Management role/services. Primary contact information: Shira zambrano     [facility] Decision Maker :   Primary Decision Maker: Vic Blankenship - Parent - [phone]          Can this person be reached and be able to respond quickly, such as within a few minutes or hours? Yes    Admit date/status:08/06/2021 Inpatient   Diagnosis: Septic arthritis   Is this a Readmission?:  No      Insurance:BCBS   Precert required for SNF: Yes       3 night stay required: No    Living arrangements, Adls, care needs, prior to admission:Home alone, IPTA    Transportation: Active       Durable Medical Equipment at home: Denies     Services in the home and/or outpatient, prior to admission:Denies- accepted supportive substance abuse resources      PT/OT recs:not ordered     Hospital Exemption Notification (HEN):possible for SNF     Barriers to discharge: IV ATB need with active SA     Plan/comments: Patient reports lives at home alone, [place]. Patient denies having PCP info provided info on care clinic and PCP to try and get established. Patient tearful during interaction re concern with seriousness of condition. Writer provided empathy/ listening. Patient did accept SA resource. SW will ct to follow. REX Melgar       ECOC on chart for MD signature

## 2021-08-06 NOTE — ED PROVIDER NOTES
EMERGENCY DEPARTMENT ENCOUNTER        Pt Name: Della Wilhelm  MRN: [id number]  [place]fterese 1980  Date of evaluation: 8/6/2021  Provider: Nadya Varela MD  PCP: No primary care provider on file. CHIEF COMPLAINT       Chief Complaint   Patient presents with    Abscess       HISTORY OFPRESENT ILLNESS   (Location/Symptom, Timing/Onset, Context/Setting, Quality, Duration, Modifying Factors,Severity)  Note limiting factors. Della Wilhelm is a 46 y.o. male presenting today due to concern for feeling like his left collarbone region has increased swelling over the last 2 to 3 weeks and thinking he may have an abscess there. He reports having some pain at this location as well. He does have a history of IV drug abuse and does admit to using fentanyl recently along with reportedly having methamphetamines along with ice and heroin earlier today. He was reportedly having decreased responsiveness prior to arrival but denies this when I asked him. He also states that he actually had not left AMA from the hospital a few days ago and just lied to get police off of him. He denies any fever today. He did start methadone clinic within the last month. He reports that roughly 1.5 months ago he developed an abscess to the left forearm from injecting but states he found antibiotics on the street and that has subsequently resolved but a couple weeks ago is when he started noticing some discomfort to the left clavicle. He denies any falls or trauma. No shortness of breath. No vomiting or diarrhea. No abdominal pain. No pain in the arms at this time. He did have a subjective fever yesterday. Due to concern for worsening left upper chest discomfort with some swelling near the left apical, he came to the ED for further evaluation. The pain has been constant throughout the day today.         REVIEW OF SYSTEMS    (2-9 systems for level 4, 10 or more for level 5)     Review of Systems   Constitutional: Positive for chills and fever (subjective yesterday). Negative for fatigue. HENT: Negative for congestion and sore throat. Respiratory: Negative for chest tightness and shortness of breath. Cardiovascular: Positive for chest pain (near left clavicle only). Gastrointestinal: Negative for abdominal pain, diarrhea and vomiting. Genitourinary: Negative for dysuria and flank pain. Musculoskeletal: Negative for arthralgias and neck pain. Skin: Positive for color change (near left clavicle). Negative for wound. Neurological: Negative for syncope, weakness, numbness and headaches. Psychiatric/Behavioral: Negative for confusion. Positives and Pertinent negatives as per HPI. PASTMEDICAL HISTORY     Past Medical History:   Diagnosis Date    Drug abuse ([facility] [address]     Hepatitis B     Hepatitis C     Kidney stones          SURGICAL HISTORY     History reviewed. No pertinent surgical history. CURRENT MEDICATIONS       Current Discharge Medication List          ALLERGIES     Patient has no known allergies. FAMILY HISTORY     History reviewed. No pertinent family history.        SOCIAL HISTORY       Social History     Socioeconomic History    Marital status:      Spouse name: None    Number of children: None    Years of education: None    Highest education level: None   Occupational History    None   Tobacco Use    Smoking status: Current Every Day Smoker     Packs/day: 1.00     Types: Cigarettes    Smokeless tobacco: Current User     Types: Chew   Vaping Use    Vaping Use: Never assessed   Substance and Sexual Activity    Alcohol use: No    Drug use: Yes     Types: IV, Cocaine, Marijuana, Methamphetamines, Opiates      Comment: heroin last used 4/28/15    Sexual activity: None   Other Topics Concern    None   Social History Narrative    None     Social Determinants of Health     Financial Resource Strain:     Difficulty of Paying Living Expenses:    Food Insecurity:     Worried About [facility] in the Last Year:    [address] in the Last Year:    Transportation Needs:     Lack of Transportation (Medical):  Lack of Transportation (Non-Medical):    Physical Activity:     Days of Exercise per Week:     Minutes of Exercise per Session:    Stress:     Feeling of Stress :    Social Connections:     Frequency of Communication with Friends and Family:     Frequency of Social Gatherings with Friends and Family:     Attends Holiness Services:     Active Member of Clubs or Organizations:     Attends Club or Organization Meetings:     Marital Status:    Intimate Partner Violence:     Fear of Current or Ex-Partner:     Emotionally Abused:     Physically Abused:     Sexually Abused:        SCREENINGS    Ione Coma Scale  Eye Opening: Spontaneous  Best Verbal Response: Oriented  Best Motor Response: Obeys commands  Karo Coma Scale Score: 15           PHYSICAL EXAM    (up to 7 for level 4, 8 or more for level 5)     ED Triage Vitals [08/06/21 0158]   BP Temp Temp Source Pulse Resp SpO2 Height Weight   (!) 145/90 98.8 °F (37.1 °C) Oral 80 17 97 % 5' 8\" (1.727 m) 180 lb (81.6 kg)       Physical Exam  Vitals and nursing note reviewed. Constitutional:       General: He is awake. He is not in acute distress. Appearance: Normal appearance. He is well-developed, well-groomed and overweight. He is not ill-appearing, toxic-appearing or diaphoretic. Interventions: He is not intubated. HENT:      Head: Normocephalic and atraumatic. Right Ear: External ear normal.      Left Ear: External ear normal.      Nose: Nose normal.      Mouth/Throat:      Mouth: Mucous membranes are moist.   Eyes:      General: No scleral icterus. Right eye: No discharge. Left eye: No discharge. Pupils: Pupils are equal, round, and reactive to light. Neck:      Vascular: No JVD. Trachea: Trachea and phonation normal. No tracheal deviation. Comments: No nuchal rigidity   Cardiovascular:      Rate and Rhythm: Normal rate and regular rhythm. Pulses: Normal pulses. Radial pulses are 2+ on the right side and 2+ on the left side. Heart sounds: No murmur heard. No friction rub. No gallop. Pulmonary:      Effort: Pulmonary effort is normal. No tachypnea, bradypnea, accessory muscle usage, prolonged expiration, respiratory distress or retractions. He is not intubated. Breath sounds: Normal breath sounds and air entry. No stridor, decreased air movement or transmitted upper airway sounds. No decreased breath sounds, wheezing, rhonchi or rales. Chest:      Chest wall: Swelling, tenderness and edema present. No lacerations, deformity or crepitus. Abdominal:      General: Bowel sounds are normal. There is no distension. Palpations: Abdomen is soft. Tenderness: There is no abdominal tenderness. There is no guarding or rebound. Musculoskeletal:         General: No deformity or signs of injury. Normal range of motion. Right shoulder: Normal.      Left shoulder: Normal.      Right elbow: No swelling, deformity, effusion or lacerations. Normal range of motion. No tenderness. Left elbow: No swelling, deformity, effusion or lacerations. Normal range of motion. No tenderness. Right forearm: No swelling, edema, deformity, lacerations, tenderness or bony tenderness. Left forearm: No swelling, edema, deformity, lacerations, tenderness or bony tenderness. Right hand: Normal. Normal capillary refill. Normal pulse. Left hand: Normal. Normal capillary refill. Normal pulse. Cervical back: Normal, full passive range of motion without pain, normal range of motion and neck supple. No swelling, edema, deformity, erythema, signs of trauma, lacerations, rigidity, spasms, torticollis, tenderness, bony tenderness or crepitus. No pain with movement, spinous process tenderness or muscular tenderness. Normal range of motion. Thoracic back: No tenderness or bony tenderness. Normal range of motion. Lumbar back: No tenderness or bony tenderness. Right lower leg: No edema. Left lower leg: No edema. Comments: No sign of any infection to the extremities but track marks noted to bilateral antecubital fossas   Skin:     General: Skin is warm and dry. Capillary Refill: Capillary refill takes less than 2 seconds. Coloration: Skin is not ashen, cyanotic, jaundiced or pale. Findings: Bruising and erythema (minimal near left clavicle) present. No abrasion, ecchymosis, signs of injury, lesion or rash. Neurological:      Mental Status: He is alert and oriented to person, place, and time. GCS: GCS eye subscore is 4. GCS verbal subscore is 5. GCS motor subscore is 6. Cranial Nerves: No dysarthria. Sensory: Sensation is intact. No sensory deficit. Motor: Motor function is intact. No weakness, tremor, atrophy, abnormal muscle tone or seizure activity. Comments: Normal strength and sensation in all 4 extremities including bilateral  strength and bilateral leg extension and ankle dorsiflexion/plantarflexion   Psychiatric:         Attention and Perception: Attention normal.         Mood and Affect: Affect normal. Mood is anxious. Speech: Speech normal. Speech is not slurred. Behavior: Behavior normal. Behavior is cooperative.              DIAGNOSTIC RESULTS   :    Labs Reviewed   CBC WITH AUTO DIFFERENTIAL - Abnormal; Notable for the following components:       Result Value    Hemoglobin 12.4 (*)     Hematocrit 35.8 (*)     All other components within normal limits    Narrative:     Performed at:  [address], [address] [place] [address]   Phone [phone]   COMPREHENSIVE METABOLIC PANEL - Abnormal; Notable for the following components:    Chloride 96 (*)     Glucose 101 (*)     CREATININE 0.8 (*) Total Protein 8.9 (*)     Albumin/Globulin Ratio 0.9 (*)     All other components within normal limits    Narrative:     Performed at:  [address],  [address], [address]   Phone [phone] - Abnormal; Notable for the following components:    Protime 16.8 (*)     INR 1.46 (*)     All other components within normal limits    Narrative:     Performed at:  [address],  [address], [address]   Phone [phone]   CULTURE, BLOOD 1   CULTURE, BLOOD 2   LACTIC ACID, PLASMA    Narrative:     Performed at:  [address],  [address], [address]   Phone [phone]   TROPONIN    Narrative:     Performed at:  [address],  [address], [address]   Phone [phone]   PROCALCITONIN    Narrative:     Performed at:  [address],  [address], [address]   Phone [phone]       All other labs were within normal range or not returned asof this dictation. EKG: All EKG's are interpreted by the Emergency Department Physician who either signs or Co-signs this chart in the absence of a cardiologist.    The Ekg interpreted by me shows  normal sinus rhythm with a rate of 74  Axis is   Normal  QTc is  normal  Intervals and Durations are unremarkable.       ST Segments: no acute change and normal  No significant change from prior EKG dated - 9/9/19  No STEMI           RADIOLOGY:   Non-plain film images such as CT, Ultrasound and MRI are read by the radiologist. Kevin Herndon images are visualized and preliminarily interpreted by the  ED Provider with the belowfindings:        Interpretation per the Radiologist below, if available at the time of this note:    CT CHEST W CONTRAST   Final Result   Findings suggest septic arthritis of the left sternomanubrial joint acetaminophen (TYLENOL) suppository 650 mg (has no administration in time range)   oxyCODONE (ROXICODONE) immediate release tablet 5 mg (has no administration in time range)   oxyCODONE (ROXICODONE) immediate release tablet 10 mg (10 mg Oral Given 8/6/21 1502)   perflutren lipid microspheres (DEFINITY) injection 1.65 mg (has no administration in time range)   cefepime (MAXIPIME) 2000 mg IVPB minibag (has no administration in time range)   enoxaparin (LOVENOX) injection 40 mg (40 mg Subcutaneous Given 8/6/21 1503)   vancomycin (VANCOCIN) 1,250 mg in dextrose 5 % 250 mL IVPB (has no administration in time range)   ketorolac (TORADOL) injection 15 mg (15 mg Intravenous Given 8/6/21 0229)   ondansetron (ZOFRAN) injection 4 mg (4 mg Intravenous Given 8/6/21 0229)   LORazepam (ATIVAN) injection 0.5 mg (0.5 mg Intravenous Given 8/6/21 0308)   iopamidol (ISOVUE-370) 76 % injection 75 mL (75 mLs Intravenous Given 8/6/21 0342)   vancomycin (VANCOCIN) 1,750 mg in dextrose 5 % 500 mL IVPB (0 mg Intravenous Stopped 8/6/21 0927)   cefepime (MAXIPIME) 2,000 mg in dextrose 5 % 50 mL IVPB (0 mg Intravenous Stopped 8/6/21 0632)   0.9 % sodium chloride infusion (1,000 mLs Intravenous New Bag 8/6/21 0557)     Patient was evaluated due to concern for left clavicle swelling and tenderness over the last couple of weeks with prior history of abscesses and patient being concerned he may have developed an abscess. There is some swelling and erythema near the left clavicle. Besides abscess, I am considering trauma, cellulitis, acute coronary syndrome, amongst other pathology. He denies any pain with breathing and story not suggestive of pulmonary embolism. No calf swelling or tenderness bilaterally. CT scan did come back positive for septic arthritis at the sternomanubrial joints along with near the clavicle where his pain is at. I did start him on vancomycin and cefepime.   Cardiothoracic surgery plans to see the patient in the hospital.  He will need IV antibiotics in order to treat the infection and possible infectious disease consultation. He was stable for the floor and felt comfortable with this plan. I did inform the patient of his septic joint and he agrees to be admitted. He may also need further work-up for possibility of endocarditis based on history of IV drug abuse although I did not hear any obvious murmur during my evaluation. The patient tolerated their visit well. The patient and / or the family were informed of the results of any tests, a time was given to answer questions. FINAL IMPRESSION      1. Septic arthritis, due to unspecified organism, septic arthritis of unspecified location ([facility] [address]    2. Pain of left clavicle          DISPOSITION/PLAN   DISPOSITION  -decision to admit      PATIENT REFERRED TO:  No follow-up provider specified.     DISCHARGEMEDICATIONS:  Current Discharge Medication List          DISCONTINUED MEDICATIONS:  Current Discharge Medication List      STOP taking these medications       ondansetron (ZOFRAN ODT) 4 MG disintegrating tablet Comments:   Reason for Stopping:                      (Please note that portions of this note were completed with a voicerecognition program.  Efforts were made to edit the dictations but occasionally words are mis-transcribed.)    Cory Rodrigues MD (electronically signed)            Cory Rodrigues MD  08/06/21 1680

## 2021-08-06 NOTE — DISCHARGE SUMMARY
Hospital Medicine Discharge Summary    Patient ID: Parul Ripa      Patient's PCP: No primary care provider on file. Admit Date: 8/6/2021     Discharge Date: 8/6/2021     Admitting Physician: Farhan Joy DO     Discharge Physician: REED Valentin - CNP     Discharge Diagnoses: Active Hospital Problems    Diagnosis     Septic arthritis ([facility] [address] [M00.9]        The patient was seen and examined on day of discharge and this discharge summary is in conjunction with any daily progress note from day of discharge. Hospital Course:   46 y.o. male who presented to [facility] with pain/swelling of the left clavicular region. Patient has a history of IV drug abuse. He reportedly started with a methadone clinic last month. CT chest findings suggest septic arthritis of the left sternomanubrial joint without fracture or evidence for abscess formation. Blood cx's ordered, patient started on Vanc/Cefepime. CT surg/ID consulted. Septic Arthritis of left sternomanubrial joint   -Vanc/cefepime  -BC pending  -CT surg/ID consult appreciate recs  -Echo ordered  -HIV scrn  -monitor for opiate withdrawal    Patient reportedly pulled his IV out and told Nursing Staff that he was signing out. AMA form signed. Physical Exam Performed:     BP (!) 100/58   Pulse 70   Temp 97.6 °F (36.4 °C) (Oral)   Resp 16   Ht 5' 8\" (1.727 m)   Wt 167 lb 9.6 oz (76 kg)   SpO2 94%   BMI 25.48 kg/m²       General appearance:  No apparent distress, appears stated age and cooperative. HEENT:  Normal cephalic, atraumatic without obvious deformity. Pupils equal, round, and reactive to light. Extra ocular muscles intact. Conjunctivae/corneas clear. Neck: Supple, with full range of motion. No jugular venous distention. Trachea midline. Respiratory:  Normal respiratory effort. Clear to auscultation, bilaterally without Rales/Wheezes/Rhonchi.   Cardiovascular:  Regular rate and rhythm with normal S1/S2 without murmurs, rubs or gallops. Abdomen: Soft, non-tender, non-distended with normal bowel sounds. Musculoskeletal:  No clubbing, cyanosis or edema bilaterally. Full range of motion without deformity. Skin: Skin color, texture, turgor normal.  No rashes or lesions. Neurologic:  Neurovascularly intact without any focal sensory/motor deficits. Cranial nerves: II-XII intact, grossly non-focal.  Psychiatric:  Alert and oriented, thought content appropriate, normal insight  Capillary Refill: Brisk,< 3 seconds   Peripheral Pulses: +2 palpable, equal bilaterally       Labs: For convenience and continuity at follow-up the following most recent labs are provided:      CBC:    Lab Results   Component Value Date    WBC 9.2 08/06/2021    HGB 12.4 08/06/2021    HCT 35.8 08/06/2021     08/06/2021       Renal:    Lab Results   Component Value Date     08/06/2021    K 3.9 08/06/2021    K 4.2 03/04/2020    CL 96 08/06/2021    CO2 28 08/06/2021    BUN 11 08/06/2021    CREATININE 0.8 08/06/2021    CALCIUM 10.2 08/06/2021         Significant Diagnostic Studies    Radiology:   CT CHEST W CONTRAST   Final Result   Findings suggest septic arthritis of the left sternomanubrial joint without   fracture or evidence for abscess formation. Consults:     IP CONSULT TO CARDIOTHORACIC SURGERY  IP CONSULT TO HOSPITALIST  IP CONSULT TO INFECTIOUS DISEASES  IP CONSULT TO PHARMACY  IP CONSULT TO PHARMACY    Disposition:  AMA    Condition at Discharge: Stable    Discharge Instructions/Follow-up:  Left AMA, follow up with PCP or return to hospital     Code Status:  Prior     Activity: activity as tolerated    Diet: regular diet      Discharge Medications:     Discharge Medication List as of 8/6/2021  5:50 PM          Time Spent on discharge is more than 30 minutes in the examination, evaluation, counseling and review of medications and discharge plan.       Signed:    REED Rosas CNP   8/14/2021      Thank you No primary care provider on file. for the opportunity to be involved in this patient's care. If you have any questions or concerns please feel free to contact me at [phone].

## 2021-08-10 LAB
BLOOD CULTURE, ROUTINE: NORMAL
CULTURE, BLOOD 2: NORMAL

## 2022-07-12 ENCOUNTER — APPOINTMENT (OUTPATIENT)
Dept: CT IMAGING | Age: 42
End: 2022-07-12
Payer: COMMERCIAL

## 2022-07-12 ENCOUNTER — HOSPITAL ENCOUNTER (EMERGENCY)
Age: 42
Discharge: HOME | End: 2022-07-12
Attending: EMERGENCY MEDICINE
Payer: COMMERCIAL

## 2022-07-12 VITALS
BODY MASS INDEX: 31.5 KG/M2 | WEIGHT: 220 LBS | TEMPERATURE: 97.8 F | SYSTOLIC BLOOD PRESSURE: 115 MMHG | RESPIRATION RATE: 16 BRPM | DIASTOLIC BLOOD PRESSURE: 54 MMHG | OXYGEN SATURATION: 99 % | HEIGHT: 70 IN | HEART RATE: 88 BPM

## 2022-07-12 DIAGNOSIS — R51.9 ACUTE NONINTRACTABLE HEADACHE, UNSPECIFIED HEADACHE TYPE: Primary | ICD-10-CM

## 2022-07-12 PROCEDURE — 99284 EMERGENCY DEPT VISIT MOD MDM: CPT

## 2022-07-12 PROCEDURE — 70450 CT HEAD/BRAIN W/O DYE: CPT

## 2022-07-12 PROCEDURE — 96375 TX/PRO/DX INJ NEW DRUG ADDON: CPT

## 2022-07-12 PROCEDURE — 6360000002 HC RX W HCPCS: Performed by: EMERGENCY MEDICINE

## 2022-07-12 PROCEDURE — 2580000003 HC RX 258: Performed by: EMERGENCY MEDICINE

## 2022-07-12 PROCEDURE — 96374 THER/PROPH/DIAG INJ IV PUSH: CPT

## 2022-07-12 PROCEDURE — 96361 HYDRATE IV INFUSION ADD-ON: CPT

## 2022-07-12 RX ORDER — DIPHENHYDRAMINE HYDROCHLORIDE 50 MG/ML
25 INJECTION INTRAMUSCULAR; INTRAVENOUS ONCE
Status: COMPLETED | OUTPATIENT
Start: 2022-07-12 | End: 2022-07-12

## 2022-07-12 RX ORDER — KETOROLAC TROMETHAMINE 30 MG/ML
15 INJECTION, SOLUTION INTRAMUSCULAR; INTRAVENOUS ONCE
Status: COMPLETED | OUTPATIENT
Start: 2022-07-12 | End: 2022-07-12

## 2022-07-12 RX ORDER — 0.9 % SODIUM CHLORIDE 0.9 %
1000 INTRAVENOUS SOLUTION INTRAVENOUS ONCE
Status: COMPLETED | OUTPATIENT
Start: 2022-07-12 | End: 2022-07-12

## 2022-07-12 RX ORDER — PROCHLORPERAZINE EDISYLATE 5 MG/ML
10 INJECTION INTRAMUSCULAR; INTRAVENOUS ONCE
Status: COMPLETED | OUTPATIENT
Start: 2022-07-12 | End: 2022-07-12

## 2022-07-12 RX ADMIN — KETOROLAC TROMETHAMINE 15 MG: 30 INJECTION, SOLUTION INTRAMUSCULAR at 05:06

## 2022-07-12 RX ADMIN — DIPHENHYDRAMINE HYDROCHLORIDE 25 MG: 50 INJECTION, SOLUTION INTRAMUSCULAR; INTRAVENOUS at 05:06

## 2022-07-12 RX ADMIN — SODIUM CHLORIDE 1000 ML: 9 INJECTION, SOLUTION INTRAVENOUS at 05:05

## 2022-07-12 RX ADMIN — PROCHLORPERAZINE EDISYLATE 10 MG: 5 INJECTION INTRAMUSCULAR; INTRAVENOUS at 05:06

## 2022-07-12 ASSESSMENT — PAIN DESCRIPTION - LOCATION
LOCATION: HEAD
LOCATION: HEAD

## 2022-07-12 ASSESSMENT — PAIN - FUNCTIONAL ASSESSMENT: PAIN_FUNCTIONAL_ASSESSMENT: 0-10

## 2022-07-12 ASSESSMENT — PAIN SCALES - GENERAL
PAINLEVEL_OUTOF10: 10

## 2022-07-14 NOTE — ED PROVIDER NOTES
[address] [facility]  ED      CHIEF COMPLAINT  Headache (since 20:30 this evening; pain in front of head; pain 10/10) and Emesis (multiple times)       HISTORY OF PRESENT ILLNESS  Annalee Rowe is a 38 y.o. male who presents to the ED complaining of headache. He tells me that the headache has been gradually occurring over the past few days. It feels like his normal migraines, however he is unable to get rid of the pain. Nursing triage note says that the headache started at 8:30 tonight, however he indicates to me it has been occurring for the past several days. He states the pain is in the front of his head, radiating to the back. He describes multiple episodes of emesis. Describes photophobia. He denies blurred or double vision. He denies focal numbness or weakness, dizziness or ambulation difficulties. He denies fever or chills. He denies any other complaints such as chest pain or shortness of breath. No other complaints, modifying factors or associated symptoms. I have reviewed the following from the nursing documentation. Past Medical History:   Diagnosis Date    Drug abuse ([facility] [address]     Hepatitis B     Hepatitis C     Kidney stones      History reviewed. No pertinent surgical history. History reviewed. No pertinent family history.   Social History     Socioeconomic History    Marital status:      Spouse name: Not on file    Number of children: Not on file    Years of education: Not on file    Highest education level: Not on file   Occupational History    Not on file   Tobacco Use    Smoking status: Current Every Day Smoker     Packs/day: 1.00     Types: Cigarettes    Smokeless tobacco: Current User     Types: Chew   Vaping Use    Vaping Use: Not on file   Substance and Sexual Activity    Alcohol use: No    Drug use: Yes     Types: Marijuana Heydi Fernandez     Comment: medical marijuana daily    Sexual activity: Yes   Other Topics Concern    Not on file   Social History Narrative    Not on file     Social Determinants of Health     Financial Resource Strain:     Difficulty of Paying Living Expenses: Not on file   Food Insecurity:     Worried About Running Out of Food in the Last Year: Not on file    Walter of Food in the Last Year: Not on file   Transportation Needs:     Lack of Transportation (Medical): Not on file    Lack of Transportation (Non-Medical): Not on file   Physical Activity:     Days of Exercise per Week: Not on file    Minutes of Exercise per Session: Not on file   Stress:     Feeling of Stress : Not on file   Social Connections:     Frequency of Communication with Friends and Family: Not on file    Frequency of Social Gatherings with Friends and Family: Not on file    Attends [employer] Services: Not on file    Active Member of [address] [place] [address] [employer] or Organizations: Not on file    Attends Club or Organization Meetings: Not on file    Marital Status: Not on file   Intimate Partner Violence:     Fear of Current or Ex-Partner: Not on file    Emotionally Abused: Not on file    Physically Abused: Not on file    Sexually Abused: Not on file   Housing Stability:     Unable to Pay for Housing in the Last Year: Not on file    Number of [place] in the Last Year: Not on file    Unstable Housing in the Last Year: Not on file     No current facility-administered medications for this encounter. No current outpatient medications on file. No Known Allergies    REVIEW OF SYSTEMS  10 systems reviewed, pertinent positives per HPI otherwise noted to be negative. PHYSICAL EXAM  BP (!) 115/54   Pulse 88   Temp 97.8 °F (36.6 °C) (Oral)   Resp 16   Ht 5' 10\" (1.778 m)   Wt 220 lb (99.8 kg)   SpO2 99%   BMI 31.57 kg/m²    Physical exam:  General appearance: awake and cooperative. no distress. Non toxic appearing. Skin: Warm and dry. No rashes or lesions. HENT: Normocephalic. Atraumatic. Mucus membranes are dry   Neck: supple. No rigidity.  Normal ROM   Eyes: demonstrate no acute abnormality. SINUSES: Chronic paranasal sinus disease. No air-fluid levels are identified. SOFT TISSUES/SKULL:  No acute abnormality of the visualized skull or soft tissues. No acute intracranial abnormality. Chronic mucoperiosteal thickening seen within the ethmoid air cells and frontoethmoidal recesses. No results found. Is this patient to be included in the SEP-1 Core Measure due to severe sepsis or septic shock? No   Exclusion criteria - the patient is NOT to be included for SEP-1 Core Measure due to: Infection is not suspected        ED COURSE/MDM  Patient seen and evaluated. Old records reviewed. Labs and imaging reviewed and results discussed with patient. The patient is a 43-year-old male presenting with a headache. Vital signs are within normal limits. He is neurologically intact and nontoxic-appearing on exam.  He is neurologically intact. Head CT was ordered and this is negative for an acute process. This is not the worst headache of his life and he is nontoxic-appearing I did not feel further work-up was indicated. He is treated with Toradol, Compazine and Benadryl. Also given fluids. He describes near resolution of his headache. He states he feels well enough to be discharged home. He is to follow-up with primary care. He is given strict return precautions back to the ED for worsening symptoms and voices understanding. During the patient's ED course, the patient was given:  Medications   prochlorperazine (COMPAZINE) injection 10 mg (10 mg IntraVENous Given 7/12/22 0506)   diphenhydrAMINE (BENADRYL) injection 25 mg (25 mg IntraVENous Given 7/12/22 0506)   0.9 % sodium chloride bolus (0 mLs IntraVENous Stopped 7/12/22 0610)   ketorolac (TORADOL) injection 15 mg (15 mg IntraVENous Given 7/12/22 0506)        CLINICAL IMPRESSION  1.  Acute nonintractable headache, unspecified headache type        Blood pressure (!) 115/54, pulse 88, temperature 97.8 °F (36.6 °C), temperature source Oral, resp. rate 16, height 5' 10\" (1.778 m), weight 220 lb (99.8 kg), SpO2 99 %. Patient was given scripts for the following medications. I counseled patient how to take these medications. There are no discharge medications for this patient. Follow-up with:  DO Monika Haque 44  [phone]    Call in 1 day        DISCLAIMER: This chart was created using Dragon dictation software. Efforts were made by me to ensure accuracy, however some errors may be present due to limitations of this technology and occasionally words are not transcribed correctly.        Vannessa OrtizJackson Hospitalkassandra  07/14/22 1059

## 2023-05-11 ENCOUNTER — APPOINTMENT (OUTPATIENT)
Dept: CT IMAGING | Age: 43
End: 2023-05-11
Payer: COMMERCIAL

## 2023-05-11 ENCOUNTER — HOSPITAL ENCOUNTER (EMERGENCY)
Age: 43
Discharge: HOME | End: 2023-05-11
Payer: COMMERCIAL

## 2023-05-11 VITALS
WEIGHT: 190 LBS | HEART RATE: 56 BPM | TEMPERATURE: 99.2 F | SYSTOLIC BLOOD PRESSURE: 105 MMHG | DIASTOLIC BLOOD PRESSURE: 57 MMHG | BODY MASS INDEX: 28.14 KG/M2 | HEIGHT: 69 IN | RESPIRATION RATE: 20 BRPM | OXYGEN SATURATION: 97 %

## 2023-05-11 DIAGNOSIS — N20.0 KIDNEY STONE: Primary | ICD-10-CM

## 2023-05-11 DIAGNOSIS — N30.01 ACUTE CYSTITIS WITH HEMATURIA: ICD-10-CM

## 2023-05-11 LAB
ANION GAP SERPL CALCULATED.3IONS-SCNC: 7 MMOL/L (ref 3–16)
BASOPHILS # BLD: 0.1 K/UL (ref 0–0.2)
BASOPHILS NFR BLD: 1 %
BILIRUB UR QL STRIP.AUTO: ABNORMAL
BUN SERPL-MCNC: 21 MG/DL (ref 7–20)
CALCIUM SERPL-MCNC: 8.7 MG/DL (ref 8.3–10.6)
CHLORIDE SERPL-SCNC: 102 MMOL/L (ref 99–110)
CLARITY UR: ABNORMAL
CO2 SERPL-SCNC: 28 MMOL/L (ref 21–32)
COLOR UR: ABNORMAL
CREAT SERPL-MCNC: 0.8 MG/DL (ref 0.9–1.3)
DEPRECATED RDW RBC AUTO: 13.9 % (ref 12.4–15.4)
EOSINOPHIL # BLD: 0.2 K/UL (ref 0–0.6)
EOSINOPHIL NFR BLD: 2.8 %
GFR SERPLBLD CREATININE-BSD FMLA CKD-EPI: >60 ML/MIN/{1.73_M2}
GLUCOSE SERPL-MCNC: 103 MG/DL (ref 70–99)
GLUCOSE UR STRIP.AUTO-MCNC: NEGATIVE MG/DL
HCT VFR BLD AUTO: 41.4 % (ref 40.5–52.5)
HGB BLD-MCNC: 13.9 G/DL (ref 13.5–17.5)
HGB UR QL STRIP.AUTO: ABNORMAL
KETONES UR STRIP.AUTO-MCNC: NEGATIVE MG/DL
LEUKOCYTE ESTERASE UR QL STRIP.AUTO: ABNORMAL
LIPASE SERPL-CCNC: 29 U/L (ref 13–60)
LYMPHOCYTES # BLD: 2.5 K/UL (ref 1–5.1)
LYMPHOCYTES NFR BLD: 44.4 %
MCH RBC QN AUTO: 31.5 PG (ref 26–34)
MCHC RBC AUTO-ENTMCNC: 33.7 G/DL (ref 31–36)
MCV RBC AUTO: 93.6 FL (ref 80–100)
MONOCYTES # BLD: 0.4 K/UL (ref 0–1.3)
MONOCYTES NFR BLD: 8.1 %
NEUTROPHILS # BLD: 2.4 K/UL (ref 1.7–7.7)
NEUTROPHILS NFR BLD: 43.7 %
NITRITE UR QL STRIP.AUTO: NEGATIVE
PH UR STRIP.AUTO: 5 [PH] (ref 5–8)
PLATELET # BLD AUTO: 140 K/UL (ref 135–450)
PMV BLD AUTO: 8.5 FL (ref 5–10.5)
POTASSIUM SERPL-SCNC: 4.1 MMOL/L (ref 3.5–5.1)
PROT UR STRIP.AUTO-MCNC: 30 MG/DL
RBC # BLD AUTO: 4.42 M/UL (ref 4.2–5.9)
RBC #/AREA URNS HPF: >100 /HPF (ref 0–4)
SODIUM SERPL-SCNC: 137 MMOL/L (ref 136–145)
SP GR UR STRIP.AUTO: >=1.03 (ref 1–1.03)
UA DIPSTICK W REFLEX MICRO PNL UR: YES
URN SPEC COLLECT METH UR: ABNORMAL
UROBILINOGEN UR STRIP-ACNC: 1 E.U./DL
WBC # BLD AUTO: 5.6 K/UL (ref 4–11)
WBC #/AREA URNS HPF: ABNORMAL /HPF (ref 0–5)

## 2023-05-11 PROCEDURE — 80048 BASIC METABOLIC PNL TOTAL CA: CPT

## 2023-05-11 PROCEDURE — 99284 EMERGENCY DEPT VISIT MOD MDM: CPT

## 2023-05-11 PROCEDURE — 74176 CT ABD & PELVIS W/O CONTRAST: CPT

## 2023-05-11 PROCEDURE — 81001 URINALYSIS AUTO W/SCOPE: CPT

## 2023-05-11 PROCEDURE — 83690 ASSAY OF LIPASE: CPT

## 2023-05-11 PROCEDURE — 2580000003 HC RX 258: Performed by: PHYSICIAN ASSISTANT

## 2023-05-11 PROCEDURE — 85025 COMPLETE CBC W/AUTO DIFF WBC: CPT

## 2023-05-11 PROCEDURE — 96365 THER/PROPH/DIAG IV INF INIT: CPT

## 2023-05-11 PROCEDURE — 96375 TX/PRO/DX INJ NEW DRUG ADDON: CPT

## 2023-05-11 PROCEDURE — 6360000002 HC RX W HCPCS: Performed by: PHYSICIAN ASSISTANT

## 2023-05-11 RX ORDER — CEPHALEXIN 500 MG/1
500 CAPSULE ORAL 4 TIMES DAILY
Qty: 28 CAPSULE | Refills: 0 | Status: SHIPPED | OUTPATIENT
Start: 2023-05-11 | End: 2023-05-18

## 2023-05-11 RX ORDER — 0.9 % SODIUM CHLORIDE 0.9 %
500 INTRAVENOUS SOLUTION INTRAVENOUS ONCE
Status: COMPLETED | OUTPATIENT
Start: 2023-05-11 | End: 2023-05-11

## 2023-05-11 RX ORDER — KETOROLAC TROMETHAMINE 30 MG/ML
15 INJECTION, SOLUTION INTRAMUSCULAR; INTRAVENOUS ONCE
Status: COMPLETED | OUTPATIENT
Start: 2023-05-11 | End: 2023-05-11

## 2023-05-11 RX ORDER — ONDANSETRON 2 MG/ML
4 INJECTION INTRAMUSCULAR; INTRAVENOUS ONCE
Status: COMPLETED | OUTPATIENT
Start: 2023-05-11 | End: 2023-05-11

## 2023-05-11 RX ORDER — TAMSULOSIN HYDROCHLORIDE 0.4 MG/1
0.4 CAPSULE ORAL DAILY
Qty: 5 CAPSULE | Refills: 0 | Status: SHIPPED | OUTPATIENT
Start: 2023-05-11 | End: 2023-05-16

## 2023-05-11 RX ORDER — METHADONE HYDROCHLORIDE 10 MG/1
90 TABLET ORAL DAILY
COMMUNITY

## 2023-05-11 RX ORDER — KETOROLAC TROMETHAMINE 10 MG/1
10 TABLET, FILM COATED ORAL EVERY 6 HOURS PRN
Qty: 20 TABLET | Refills: 0 | Status: SHIPPED | OUTPATIENT
Start: 2023-05-11 | End: 2023-05-19 | Stop reason: SDUPTHER

## 2023-05-11 RX ORDER — ONDANSETRON 4 MG/1
4 TABLET, FILM COATED ORAL EVERY 8 HOURS PRN
Qty: 20 TABLET | Refills: 0 | Status: SHIPPED | OUTPATIENT
Start: 2023-05-11

## 2023-05-11 RX ADMIN — ONDANSETRON 4 MG: 2 INJECTION INTRAMUSCULAR; INTRAVENOUS at 17:38

## 2023-05-11 RX ADMIN — SODIUM CHLORIDE 500 ML: 9 INJECTION, SOLUTION INTRAVENOUS at 17:36

## 2023-05-11 RX ADMIN — KETOROLAC TROMETHAMINE 15 MG: 30 INJECTION, SOLUTION INTRAMUSCULAR at 17:38

## 2023-05-11 RX ADMIN — CEFTRIAXONE SODIUM 1000 MG: 1 INJECTION, POWDER, FOR SOLUTION INTRAMUSCULAR; INTRAVENOUS at 18:24

## 2023-05-11 ASSESSMENT — PAIN - FUNCTIONAL ASSESSMENT: PAIN_FUNCTIONAL_ASSESSMENT: NONE - DENIES PAIN

## 2023-05-12 ENCOUNTER — HOSPITAL ENCOUNTER (OUTPATIENT)
Dept: ULTRASOUND IMAGING | Age: 43
Discharge: HOME | End: 2023-05-12
Payer: COMMERCIAL

## 2023-05-12 DIAGNOSIS — R76.8 HEPATITIS A ANTIBODY POSITIVE: ICD-10-CM

## 2023-05-12 PROCEDURE — 76705 ECHO EXAM OF ABDOMEN: CPT

## 2023-05-13 ASSESSMENT — ENCOUNTER SYMPTOMS
SINUS PRESSURE: 0
CHEST TIGHTNESS: 0
NAUSEA: 0
COUGH: 0
SINUS PAIN: 0
EYE DISCHARGE: 0
VOMITING: 0
SORE THROAT: 0
ABDOMINAL PAIN: 0
DIARRHEA: 0
CONSTIPATION: 0
SHORTNESS OF BREATH: 0
RHINORRHEA: 0
EYE REDNESS: 0

## 2023-05-13 NOTE — ED PROVIDER NOTES
hours as needed for Pain, Disp-20 tablet, R-0Normal      tamsulosin (FLOMAX) 0.4 MG capsule Take 1 capsule by mouth daily for 5 doses, Disp-5 capsule, R-0Normal      ondansetron (ZOFRAN) 4 MG tablet Take 1 tablet by mouth every 8 hours as needed for Nausea, Disp-20 tablet, R-0Normal             DISCONTINUED MEDICATIONS:  Discharge Medication List as of 5/11/2023  7:03 PM                 (Please note that portions of this note were completed with a voice recognition program.  Efforts were made to edit the dictations but occasionally words are mis-transcribed.)    Jes Mitchell PA-C (electronically signed)          Jes Mitchell PA-C  05/13/23 1919

## 2023-05-19 ENCOUNTER — APPOINTMENT (OUTPATIENT)
Dept: CT IMAGING | Age: 43
End: 2023-05-19
Payer: COMMERCIAL

## 2023-05-19 ENCOUNTER — HOSPITAL ENCOUNTER (EMERGENCY)
Age: 43
Discharge: HOME | End: 2023-05-19
Payer: COMMERCIAL

## 2023-05-19 VITALS
DIASTOLIC BLOOD PRESSURE: 88 MMHG | HEART RATE: 52 BPM | RESPIRATION RATE: 16 BRPM | HEIGHT: 69 IN | SYSTOLIC BLOOD PRESSURE: 120 MMHG | WEIGHT: 190 LBS | OXYGEN SATURATION: 97 % | BODY MASS INDEX: 28.14 KG/M2 | TEMPERATURE: 98.1 F

## 2023-05-19 DIAGNOSIS — R10.9 FLANK PAIN: Primary | ICD-10-CM

## 2023-05-19 DIAGNOSIS — N20.1 URETERAL CALCULUS: ICD-10-CM

## 2023-05-19 LAB
ALBUMIN SERPL-MCNC: 3.7 G/DL (ref 3.4–5)
ALBUMIN/GLOB SERPL: 1.2 {RATIO} (ref 1.1–2.2)
ALP SERPL-CCNC: 110 U/L (ref 40–129)
ALT SERPL-CCNC: 61 U/L (ref 10–40)
ANION GAP SERPL CALCULATED.3IONS-SCNC: 8 MMOL/L (ref 3–16)
AST SERPL-CCNC: 74 U/L (ref 15–37)
BASOPHILS # BLD: 0 K/UL (ref 0–0.2)
BASOPHILS NFR BLD: 0.4 %
BILIRUB SERPL-MCNC: 0.4 MG/DL (ref 0–1)
BILIRUB UR QL STRIP.AUTO: NEGATIVE
BUN SERPL-MCNC: 17 MG/DL (ref 7–20)
CALCIUM SERPL-MCNC: 8.9 MG/DL (ref 8.3–10.6)
CHLORIDE SERPL-SCNC: 103 MMOL/L (ref 99–110)
CLARITY UR: CLEAR
CO2 SERPL-SCNC: 27 MMOL/L (ref 21–32)
COLOR UR: YELLOW
CREAT SERPL-MCNC: 0.8 MG/DL (ref 0.9–1.3)
DEPRECATED RDW RBC AUTO: 14.1 % (ref 12.4–15.4)
EOSINOPHIL # BLD: 0.1 K/UL (ref 0–0.6)
EOSINOPHIL NFR BLD: 1.1 %
GFR SERPLBLD CREATININE-BSD FMLA CKD-EPI: >60 ML/MIN/{1.73_M2}
GLUCOSE SERPL-MCNC: 96 MG/DL (ref 70–99)
GLUCOSE UR STRIP.AUTO-MCNC: NEGATIVE MG/DL
HCT VFR BLD AUTO: 40 % (ref 40.5–52.5)
HGB BLD-MCNC: 13.5 G/DL (ref 13.5–17.5)
HGB UR QL STRIP.AUTO: ABNORMAL
KETONES UR STRIP.AUTO-MCNC: NEGATIVE MG/DL
LEUKOCYTE ESTERASE UR QL STRIP.AUTO: ABNORMAL
LYMPHOCYTES # BLD: 2.2 K/UL (ref 1–5.1)
LYMPHOCYTES NFR BLD: 35.6 %
MCH RBC QN AUTO: 31.5 PG (ref 26–34)
MCHC RBC AUTO-ENTMCNC: 33.6 G/DL (ref 31–36)
MCV RBC AUTO: 93.8 FL (ref 80–100)
MONOCYTES # BLD: 0.5 K/UL (ref 0–1.3)
MONOCYTES NFR BLD: 7.5 %
NEUTROPHILS # BLD: 3.5 K/UL (ref 1.7–7.7)
NEUTROPHILS NFR BLD: 55.4 %
NITRITE UR QL STRIP.AUTO: NEGATIVE
PH UR STRIP.AUTO: 6 [PH] (ref 5–8)
PLATELET # BLD AUTO: 103 K/UL (ref 135–450)
PMV BLD AUTO: 8.8 FL (ref 5–10.5)
POTASSIUM SERPL-SCNC: 4 MMOL/L (ref 3.5–5.1)
PROT SERPL-MCNC: 6.7 G/DL (ref 6.4–8.2)
PROT UR STRIP.AUTO-MCNC: NEGATIVE MG/DL
RBC # BLD AUTO: 4.27 M/UL (ref 4.2–5.9)
RBC #/AREA URNS HPF: NORMAL /HPF (ref 0–4)
SODIUM SERPL-SCNC: 138 MMOL/L (ref 136–145)
SP GR UR STRIP.AUTO: 1.02 (ref 1–1.03)
UA DIPSTICK W REFLEX MICRO PNL UR: YES
URN SPEC COLLECT METH UR: ABNORMAL
UROBILINOGEN UR STRIP-ACNC: 1 E.U./DL
WBC # BLD AUTO: 6.3 K/UL (ref 4–11)
WBC #/AREA URNS HPF: NORMAL /HPF (ref 0–5)

## 2023-05-19 PROCEDURE — 85025 COMPLETE CBC W/AUTO DIFF WBC: CPT

## 2023-05-19 PROCEDURE — 74176 CT ABD & PELVIS W/O CONTRAST: CPT

## 2023-05-19 PROCEDURE — 80053 COMPREHEN METABOLIC PANEL: CPT

## 2023-05-19 PROCEDURE — 81001 URINALYSIS AUTO W/SCOPE: CPT

## 2023-05-19 PROCEDURE — 99284 EMERGENCY DEPT VISIT MOD MDM: CPT

## 2023-05-19 RX ORDER — KETOROLAC TROMETHAMINE 10 MG/1
10 TABLET, FILM COATED ORAL EVERY 6 HOURS PRN
Qty: 20 TABLET | Refills: 0 | Status: SHIPPED | OUTPATIENT
Start: 2023-05-19 | End: 2024-05-18

## 2023-05-19 RX ORDER — TAMSULOSIN HYDROCHLORIDE 0.4 MG/1
0.4 CAPSULE ORAL DAILY
COMMUNITY
End: 2023-05-19 | Stop reason: SDUPTHER

## 2023-05-19 RX ORDER — TAMSULOSIN HYDROCHLORIDE 0.4 MG/1
0.4 CAPSULE ORAL DAILY
Qty: 30 CAPSULE | Refills: 0 | Status: SHIPPED | OUTPATIENT
Start: 2023-05-19

## 2023-05-19 ASSESSMENT — PAIN - FUNCTIONAL ASSESSMENT: PAIN_FUNCTIONAL_ASSESSMENT: 0-10

## 2023-05-19 ASSESSMENT — PAIN SCALES - GENERAL: PAINLEVEL_OUTOF10: 0

## 2023-05-20 NOTE — ED NOTES
Discharge instructions explained by ED provider. Patient verbalized understanding and denies any other concerns or complaints at this time. Patient vital signs stable and no acute signs or symptoms of distress noted at discharge. Patient deemed clinically stable. Patient d/c home.        Jack Matthews RN  05/19/23 5889

## 2023-05-22 ENCOUNTER — APPOINTMENT (OUTPATIENT)
Dept: GENERAL RADIOLOGY | Age: 43
End: 2023-05-22
Payer: COMMERCIAL

## 2023-05-22 ENCOUNTER — HOSPITAL ENCOUNTER (EMERGENCY)
Age: 43
Discharge: HOME | End: 2023-05-22
Payer: COMMERCIAL

## 2023-05-22 VITALS
HEART RATE: 65 BPM | BODY MASS INDEX: 28.14 KG/M2 | HEIGHT: 69 IN | RESPIRATION RATE: 19 BRPM | OXYGEN SATURATION: 93 % | DIASTOLIC BLOOD PRESSURE: 82 MMHG | TEMPERATURE: 98.8 F | WEIGHT: 190 LBS | SYSTOLIC BLOOD PRESSURE: 130 MMHG

## 2023-05-22 DIAGNOSIS — R10.9 RIGHT FLANK PAIN: ICD-10-CM

## 2023-05-22 DIAGNOSIS — N20.0 KIDNEY STONE: ICD-10-CM

## 2023-05-22 DIAGNOSIS — N20.1 URETEROLITHIASIS: Primary | ICD-10-CM

## 2023-05-22 LAB
ALBUMIN SERPL-MCNC: 3.4 G/DL (ref 3.4–5)
ALBUMIN/GLOB SERPL: 0.9 {RATIO} (ref 1.1–2.2)
ALP SERPL-CCNC: 115 U/L (ref 40–129)
ALT SERPL-CCNC: 60 U/L (ref 10–40)
ANION GAP SERPL CALCULATED.3IONS-SCNC: 12 MMOL/L (ref 3–16)
AST SERPL-CCNC: 84 U/L (ref 15–37)
BACTERIA URNS QL MICRO: ABNORMAL /HPF
BASOPHILS # BLD: 0 K/UL (ref 0–0.2)
BASOPHILS NFR BLD: 0.6 %
BILIRUB SERPL-MCNC: 0.4 MG/DL (ref 0–1)
BILIRUB UR QL STRIP.AUTO: NEGATIVE
BUN SERPL-MCNC: 14 MG/DL (ref 7–20)
CALCIUM SERPL-MCNC: 8.8 MG/DL (ref 8.3–10.6)
CHLORIDE SERPL-SCNC: 103 MMOL/L (ref 99–110)
CLARITY UR: CLEAR
CO2 SERPL-SCNC: 23 MMOL/L (ref 21–32)
COLOR UR: YELLOW
CREAT SERPL-MCNC: 0.8 MG/DL (ref 0.9–1.3)
CRYSTALS URNS MICRO: ABNORMAL /HPF
DEPRECATED RDW RBC AUTO: 14.2 % (ref 12.4–15.4)
EOSINOPHIL # BLD: 0 K/UL (ref 0–0.6)
EOSINOPHIL NFR BLD: 0.9 %
EPI CELLS #/AREA URNS HPF: ABNORMAL /HPF (ref 0–5)
GFR SERPLBLD CREATININE-BSD FMLA CKD-EPI: >60 ML/MIN/{1.73_M2}
GLUCOSE SERPL-MCNC: 121 MG/DL (ref 70–99)
GLUCOSE UR STRIP.AUTO-MCNC: NEGATIVE MG/DL
HCT VFR BLD AUTO: 44.5 % (ref 40.5–52.5)
HGB BLD-MCNC: 15.1 G/DL (ref 13.5–17.5)
HGB UR QL STRIP.AUTO: ABNORMAL
KETONES UR STRIP.AUTO-MCNC: NEGATIVE MG/DL
LEUKOCYTE ESTERASE UR QL STRIP.AUTO: ABNORMAL
LYMPHOCYTES # BLD: 1.3 K/UL (ref 1–5.1)
LYMPHOCYTES NFR BLD: 23.9 %
MCH RBC QN AUTO: 31.9 PG (ref 26–34)
MCHC RBC AUTO-ENTMCNC: 33.9 G/DL (ref 31–36)
MCV RBC AUTO: 94.1 FL (ref 80–100)
MONOCYTES # BLD: 0.4 K/UL (ref 0–1.3)
MONOCYTES NFR BLD: 7.1 %
NEUTROPHILS # BLD: 3.8 K/UL (ref 1.7–7.7)
NEUTROPHILS NFR BLD: 67.5 %
NITRITE UR QL STRIP.AUTO: NEGATIVE
PH UR STRIP.AUTO: 5.5 [PH] (ref 5–8)
PLATELET # BLD AUTO: 122 K/UL (ref 135–450)
PMV BLD AUTO: 9.3 FL (ref 5–10.5)
POTASSIUM SERPL-SCNC: 4.3 MMOL/L (ref 3.5–5.1)
PROT SERPL-MCNC: 7.3 G/DL (ref 6.4–8.2)
PROT UR STRIP.AUTO-MCNC: 30 MG/DL
RBC # BLD AUTO: 4.74 M/UL (ref 4.2–5.9)
RBC #/AREA URNS HPF: ABNORMAL /HPF (ref 0–4)
SODIUM SERPL-SCNC: 138 MMOL/L (ref 136–145)
SP GR UR STRIP.AUTO: >=1.03 (ref 1–1.03)
UA COMPLETE W REFLEX CULTURE PNL UR: ABNORMAL
UA DIPSTICK W REFLEX MICRO PNL UR: YES
URN SPEC COLLECT METH UR: ABNORMAL
UROBILINOGEN UR STRIP-ACNC: 1 E.U./DL
WBC # BLD AUTO: 5.6 K/UL (ref 4–11)
WBC #/AREA URNS HPF: ABNORMAL /HPF (ref 0–5)

## 2023-05-22 PROCEDURE — 74018 RADEX ABDOMEN 1 VIEW: CPT

## 2023-05-22 PROCEDURE — 81001 URINALYSIS AUTO W/SCOPE: CPT

## 2023-05-22 PROCEDURE — 96374 THER/PROPH/DIAG INJ IV PUSH: CPT

## 2023-05-22 PROCEDURE — 36415 COLL VENOUS BLD VENIPUNCTURE: CPT

## 2023-05-22 PROCEDURE — 85025 COMPLETE CBC W/AUTO DIFF WBC: CPT

## 2023-05-22 PROCEDURE — 99284 EMERGENCY DEPT VISIT MOD MDM: CPT

## 2023-05-22 PROCEDURE — 80053 COMPREHEN METABOLIC PANEL: CPT

## 2023-05-22 PROCEDURE — 96375 TX/PRO/DX INJ NEW DRUG ADDON: CPT

## 2023-05-22 PROCEDURE — 6360000002 HC RX W HCPCS: Performed by: PHYSICIAN ASSISTANT

## 2023-05-22 RX ORDER — ONDANSETRON 2 MG/ML
4 INJECTION INTRAMUSCULAR; INTRAVENOUS ONCE
Status: COMPLETED | OUTPATIENT
Start: 2023-05-22 | End: 2023-05-22

## 2023-05-22 RX ORDER — KETOROLAC TROMETHAMINE 30 MG/ML
15 INJECTION, SOLUTION INTRAMUSCULAR; INTRAVENOUS ONCE
Status: COMPLETED | OUTPATIENT
Start: 2023-05-22 | End: 2023-05-22

## 2023-05-22 RX ADMIN — ONDANSETRON 4 MG: 2 INJECTION INTRAMUSCULAR; INTRAVENOUS at 10:22

## 2023-05-22 RX ADMIN — KETOROLAC TROMETHAMINE 15 MG: 30 INJECTION, SOLUTION INTRAMUSCULAR at 10:22

## 2023-05-22 ASSESSMENT — PAIN - FUNCTIONAL ASSESSMENT: PAIN_FUNCTIONAL_ASSESSMENT: 0-10

## 2023-05-22 ASSESSMENT — PAIN DESCRIPTION - LOCATION
LOCATION: FLANK
LOCATION: FLANK

## 2023-05-22 ASSESSMENT — PAIN DESCRIPTION - PAIN TYPE: TYPE: ACUTE PAIN

## 2023-05-22 ASSESSMENT — PAIN DESCRIPTION - FREQUENCY: FREQUENCY: CONTINUOUS

## 2023-05-22 ASSESSMENT — PAIN DESCRIPTION - ORIENTATION: ORIENTATION: RIGHT

## 2023-05-22 ASSESSMENT — PAIN DESCRIPTION - DESCRIPTORS: DESCRIPTORS: ACHING

## 2023-05-22 ASSESSMENT — PAIN SCALES - GENERAL
PAINLEVEL_OUTOF10: 6
PAINLEVEL_OUTOF10: 4

## 2023-05-22 NOTE — ED NOTES
Patient requesting pain medication.  No provider assigned at this time, charge RN aware     Marie Ji RN  05/22/23 1002

## 2023-05-22 NOTE — DISCHARGE INSTRUCTIONS
Urinalysis showed evidence of calcium oxalate. I believe this to be a calcium oxalate stone. KUB x-ray not showing stone due to overlying bowel gas. I did speak with urology. You have an appointment tomorrow at 12:20 PM.  Below you will find location. Do not fill Toradol. I recommend ibuprofen 600 mg or 800 mg every 6 or 8 hours and Tylenol 1000 mg every 6 or 8 hours. Increase your fluid intake. Continue all medications as currently prescribed with the exception of Toradol.

## 2023-05-22 NOTE — CONSULTS
1058 - called [facility] per consult  Re: flank pain; 3rd ED visit  [phone] - called [facility] again since no call back yet  [phone] - DELMI Guzmán called back to speak with DELMI Blackman

## 2023-05-25 NOTE — ED PROVIDER NOTES
pertinent family history. SOCIAL HISTORY       Social History     Tobacco Use    Smoking status: Every Day     Packs/day: 1.00     Types: Cigarettes    Smokeless tobacco: Current     Types: Chew   Substance Use Topics    Alcohol use: No    Drug use: Yes     Types: Marijuana (Weed)     Comment: medical marijuana daily       SCREENINGS        Karo Coma Scale  Eye Opening: Spontaneous  Best Verbal Response: Oriented  Best Motor Response: Obeys commands  Karo Coma Scale Score: 15                CIWA Assessment  BP: 120/88  Pulse: 52           PHYSICAL EXAM  1 or more Elements     ED Triage Vitals [05/19/23 1831]   BP Temp Temp Source Pulse Respirations SpO2 Height Weight - Scale   (!) 141/96 98.1 °F (36.7 °C) Oral 57 16 97 % 5' 9\" (1.753 m) 190 lb (86.2 kg)       Physical Exam    No CVA tenderness on exam, abdomen is soft, nontender, nondistended, lungs clear to auscultation bilaterally heart is regular rate and rhythm. Patient is alert and oriented, does not appear to be acutely distressed. DIAGNOSTIC RESULTS   LABS:    Labs Reviewed   CBC WITH AUTO DIFFERENTIAL - Abnormal; Notable for the following components:       Result Value    Hematocrit 40.0 (*)     Platelets 670 (*)     All other components within normal limits   COMPREHENSIVE METABOLIC PANEL - Abnormal; Notable for the following components:    Creatinine 0.8 (*)     ALT 61 (*)     AST 74 (*)     All other components within normal limits   URINALYSIS - Abnormal; Notable for the following components:    Blood, Urine TRACE-INTACT (*)     Leukocyte Esterase, Urine TRACE (*)     All other components within normal limits   MICROSCOPIC URINALYSIS       When ordered only abnormal lab results are displayed. All other labs were within normal range or not returned as of this dictation. EKG:  When ordered, EKG's are interpreted by the Emergency Department Physician in the absence of a cardiologist.  Please see their note for interpretation of

## 2024-01-08 ENCOUNTER — HOSPITAL ENCOUNTER (OUTPATIENT)
Dept: ULTRASOUND IMAGING | Age: 44
Discharge: HOME | End: 2024-01-08
Payer: COMMERCIAL

## 2024-01-08 DIAGNOSIS — R76.8 HEPATITIS B SURFACE ANTIGEN POSITIVE: ICD-10-CM

## 2024-01-08 PROCEDURE — 76705 ECHO EXAM OF ABDOMEN: CPT

## 2024-05-06 ENCOUNTER — INITIAL CONSULT (OUTPATIENT)
Dept: SURGERY | Age: 44
End: 2024-05-06
Payer: COMMERCIAL

## 2024-05-06 VITALS
WEIGHT: 185.6 LBS | SYSTOLIC BLOOD PRESSURE: 128 MMHG | DIASTOLIC BLOOD PRESSURE: 68 MMHG | HEIGHT: 69 IN | BODY MASS INDEX: 27.49 KG/M2

## 2024-05-06 DIAGNOSIS — D17.22 LIPOMA OF LEFT UPPER EXTREMITY: Primary | ICD-10-CM

## 2024-05-06 PROCEDURE — 99204 OFFICE O/P NEW MOD 45 MIN: CPT | Performed by: SURGERY

## 2024-05-07 ENCOUNTER — PREP FOR PROCEDURE (OUTPATIENT)
Dept: SURGERY | Age: 44
End: 2024-05-07

## 2024-05-07 ENCOUNTER — TELEPHONE (OUTPATIENT)
Dept: SURGERY | Age: 44
End: 2024-05-07

## 2024-05-07 PROBLEM — D17.20 LIPOMA OF ARM: Status: ACTIVE | Noted: 2024-05-07

## 2024-05-07 NOTE — PROGRESS NOTES
[facility] [place]    Age 43 y.o.    male    1980    MRN [id number]    5/13/2024  Arrival Time_____________  OR Time____________75 Min     Procedure(s):  LEFT ARM LIPOMA EXCISION                      Local    Surgeon(s):  Jer Torres, MD       Phone [phone] (home)     Initals  Date  Info Source  Home  Cell         Work  _____________________________________________________________________  _____________________________________________________________________  _____________________________________________________________________  _____________________________________________________________________  _____________________________________________________________________    PCP _____________________________ Phone_________________     H&P  ________________  Bringing      Chart              Epic      DOS      Called________  EKG ________________   Bringing      Chart              Epic      DOS      Called________  LABS________________   Bringing     Chart              Epic      DOS      Called________  Cardiac Clearance ______ Bringing      Chart              Epic      DOS      Called________  Pulmonary Clearance____ Bringing      Chart              Epic      DOS      Called________    Cardiologist________________________ Phone___________________________  Pulmonologist_______________________Phone___________________________    ? Advance Directives   ? Sikhism concerns / Waiver on Chart            PAT Communications________________  ? Pre-op Instructions Given /Understood          _________________________________  ? Directions to Surgery Center                          _________________________________  ? Transportation Home_______________      __________________________________  ? Crutches/Walker__________________        __________________________________    Orders: Hard copy/ EPIC                 Transcribed/ EPIC              _______Wt.    ________Pharmacy                         _______SCD

## 2024-05-07 NOTE — PROGRESS NOTES
Date and time of surgery :  5/13/24 at 0830            Arrival Time: 0730      Bring Picture ID and insurance card.  Please wear simple, loose fitting clothing to the hospital.   Do not bring valuables (money, credit cards, checkbooks, etc.)   DO NOT wear any jewelry or piercings on day of surgery.  All body piercing jewelry must be removed.  If you have dentures, they will be removed before going to the OR; we will provide you a container.  If you wear contact lenses or glasses, they will be removed; please bring a case for them.  Shower the evening before or morning of surgery with antibacterial soap.  Aspirin, Ibuprofen, Advil, Naproxen, Vitamin E and other Anti-inflammatory products and supplements should be stopped for 5 -7days before surgery or as directed by your physician.  Do not smoke or drink any alcoholic beverages 24 hours prior to surgery.  This includes NA Beer. Refrain from the usage of any recreational drugs, including non-prescribed prescription drugs.   To help prevent infection, change your sheets the night before surgery.   If you  have a Living Will and Durable Power of  for Healthcare, please bring in a copy.  Notify your Surgeon if you develop any illness between now and time of surgery. Cough, cold, fever, sore throat, nausea, vomiting, etc.  Please notify your surgeon if you experience dizziness, shortness of breath or blurred vision between now & the time of your surgery  To provide excellent care visitors will be limited to two per room at any given time. No visitors under the age of 14.  If you use oxygen and have a portable tank please bring it with you the DOS  For your convenience [facility] has a pharmacy on site to fill your prescriptions.     *Please call pre admission testing if you have any further questions             Matt      [phone]                            Address: [address] [place] [address]     When you pull into the hospital and are looking at the

## 2024-05-07 NOTE — PROGRESS NOTES
Financial Resource Strain: Not on file   Food Insecurity: Not on file   Transportation Needs: Not on file   Physical Activity: Not on file   Stress: Not on file   Social Connections: Not on file   Intimate Partner Violence: Not on file   Housing Stability: Not on file         Family History:        Problem Relation Age of Onset    No Known Problems Mother     No Known Problems Father        REVIEW OF SYSTEMS:  CONSTITUTIONAL:  negative  HEENT:  negative  RESPIRATORY:  negative  CARDIOVASCULAR:  negative  GASTROINTESTINAL:  negative   GENITOURINARY:  negative  HEMATOLOGIC/LYMPHATIC:  negative  NEUROLOGICAL:  Negative  * All other ROS reviewed and negative.       PHYSICAL EXAM:  VITALS:  /68 (Site: Left Upper Arm, Position: Sitting, Cuff Size: Medium Adult)   Ht 1.753 m (5' 9.02\")   Wt 84.2 kg (185 lb 9.6 oz)   BMI 27.40 kg/m²   24HR INTAKE/OUTPUT:    [unfilled]  [unfilled]      CONSTITUTIONAL:  alert, no apparent distress and normal weight  EYES:  PERRL, sclera clear  ENT:  Normocephalic,atraumatic, without obvious abnormality  NECK:  supple, symmetrical, trachea midline  LUNGS: Resp effort easy and unlabored, no crackles or wheezing  CARDIOVASCULAR:  NO JVD, regular rate   ABDOMEN:  , normal bowel sounds, soft, non-distended, non-tender,   MUSCULOSKELETAL: No clubbing or cyanosis, 0+ pitting edema lower extremities  NEUROLOGIC:  Mental Status Exam:  Level of Alertness:   awake  PSYCHIATRIC:   person, place, time  SKIN:  2-3 cm anterior left forearm mobile lipoma    DATA:    CBC: No results for input(s): \"WBC\", \"HGB\", \"HCT\", \"PLT\" in the last 72 hours.  BMP:  No results for input(s): \"NA\", \"K\", \"CL\", \"CO2\", \"BUN\", \"CREATININE\", \"GLUCOSE\" in the last 72 hours.  Hepatic: No results for input(s): \"AST\", \"ALT\", \"BILITOT\", \"ALKPHOS\" in the last 72 hours.    Invalid input(s): \"ALB\"  Mag:    No results for input(s): \"MG\" in the last 72 hours.   Phos:   No results for input(s): \"PHOS\" in the last 72

## 2024-05-07 NOTE — TELEPHONE ENCOUNTER
Pt was seen in the office on 5/6/24 and was given surgery instructions for an excision of left arm lipoma. Scheduled for  5/13/24@ 0830/0730 arrival. Dr Torres will do pre-op physical day of procedure. Pt understood and agreed to above.

## 2024-05-13 ENCOUNTER — HOSPITAL ENCOUNTER (OUTPATIENT)
Age: 44
Setting detail: OUTPATIENT SURGERY
Discharge: HOME | End: 2024-05-13
Attending: SURGERY | Admitting: SURGERY
Payer: COMMERCIAL

## 2024-05-13 VITALS
SYSTOLIC BLOOD PRESSURE: 132 MMHG | HEIGHT: 70 IN | WEIGHT: 185 LBS | TEMPERATURE: 98.4 F | HEART RATE: 68 BPM | RESPIRATION RATE: 16 BRPM | DIASTOLIC BLOOD PRESSURE: 69 MMHG | BODY MASS INDEX: 26.48 KG/M2 | OXYGEN SATURATION: 97 %

## 2024-05-13 DIAGNOSIS — D17.20 LIPOMA OF ARM: ICD-10-CM

## 2024-05-13 PROCEDURE — 2709999900 HC NON-CHARGEABLE SUPPLY: Performed by: SURGERY

## 2024-05-13 PROCEDURE — 2580000003 HC RX 258: Performed by: SURGERY

## 2024-05-13 PROCEDURE — 3600000015 HC SURGERY LEVEL 5 ADDTL 15MIN: Performed by: SURGERY

## 2024-05-13 PROCEDURE — A4217 STERILE WATER/SALINE, 500 ML: HCPCS | Performed by: SURGERY

## 2024-05-13 PROCEDURE — 25075 EXC FOREARM LES SC < 3 CM: CPT | Performed by: SURGERY

## 2024-05-13 PROCEDURE — 3600000005 HC SURGERY LEVEL 5 BASE: Performed by: SURGERY

## 2024-05-13 PROCEDURE — 88304 TISSUE EXAM BY PATHOLOGIST: CPT

## 2024-05-13 PROCEDURE — 2500000003 HC RX 250 WO HCPCS: Performed by: SURGERY

## 2024-05-13 PROCEDURE — 7100000010 HC PHASE II RECOVERY - FIRST 15 MIN: Performed by: SURGERY

## 2024-05-13 RX ORDER — BUPIVACAINE HYDROCHLORIDE AND EPINEPHRINE 5; 5 MG/ML; UG/ML
INJECTION, SOLUTION PERINEURAL PRN
Status: DISCONTINUED | OUTPATIENT
Start: 2024-05-13 | End: 2024-05-13 | Stop reason: ALTCHOICE

## 2024-05-13 RX ORDER — MAGNESIUM HYDROXIDE 1200 MG/15ML
LIQUID ORAL CONTINUOUS PRN
Status: COMPLETED | OUTPATIENT
Start: 2024-05-13 | End: 2024-05-13

## 2024-05-13 ASSESSMENT — PAIN SCALES - GENERAL
PAINLEVEL_OUTOF10: 0
PAINLEVEL_OUTOF10: 0

## 2024-05-13 ASSESSMENT — PAIN - FUNCTIONAL ASSESSMENT: PAIN_FUNCTIONAL_ASSESSMENT: NONE - DENIES PAIN

## 2024-05-13 NOTE — DISCHARGE INSTRUCTIONS
may NOT accept any medications to drop off in the cabinet for you.  What is a Surgical Site Infection or  (SSI)?        A surgical site infection (SSI) is an infection that occurs after surgery in the part of the body where the surgery took place. Most patients who have surgery do not develop an infection. However, infections can develop in about 1-3 cases for every 100 patients who have had surgery.  Our goal is for you to NOT experience any complications and be completely satisfied with your care!    However, some signs or symptoms to look for and report immediately to your doctor are:   1. Fever above 101 degrees    2. Redness and increasing pain around the area  where you had surgery   3. Drainage of cloudy fluid or pus coming from the surgical area    Some of the things we/ you can do to prevent SSI's are:   1. Clean hands with soap and water or an alcohol-based hand rub before and after caring for the operative area. This occurs the day of surgery and for the next 2 weeks.   2.Sometimes you receive an appropriate antibiotic within 60 minutes before your surgery or take one for several days after surgery depending on your surgeon's instructions and/or the type of surgery you are having.    3. Family and/or friends who visit you should NOT touch the surgical wound or dressings until advised by your surgeon.    4. Be sure to elevate and decrease the swelling after your surgery to help prevent infection.   5. If you are a diabetic, you need to closely monitor your blood sugar levels and report any significant increases or changes to your surgeon to help promote the healing process.

## 2024-05-13 NOTE — OP NOTE
Date of Surgery: 5/13/24    Preop Dx:  Left forearm lipoma    Postop Dx:  same    Procedure:  Excision of left forearm lipoma    Surgeon:  Brian    Assistant:      Anesthesia:  local    EBL:   <50ml    Specimen:  Left forearm lipoma    Complications: none    Drains/Lines:  none    Indications:  42 yo with left forearm lipoma with some increased sie    Description:  Patient was given adequate description of the risks and rewards of the procedure, including bleeding, infection and freely consented.   Pt was placed in supine position.  Prepped and draped in usual sterile fashion.     Area over lipoma anesthetized with local anesthetic.   Incision made and carried down circumferentially around lipoma using combination of blunt dissection and electrocautery.  Lipoma was into subcutaneous tissue and was [id number] and was removed and sent to pathology.  Wound irrigated and hemostasis assured.  Subcutaneous tissue closed with interrupted 3-0 vicryl.  4-0 monocryl used to close the epidermis.     Sterile dressing placed.  All suture, sponge and instrument count correct times two at end of case.  Transferred to PACU in stable condition.    Bill Torres MD

## 2024-05-13 NOTE — H&P
Cigarettes    Smokeless tobacco: Former       Types: Chew       Quit date: 5/1/2004   Vaping Use    Vaping Use: Never used   Substance and Sexual Activity    Alcohol use: No    Drug use: Yes       Types: Marijuana (Weed)       Comment: medical marijuana daily    Sexual activity: Yes   Other Topics Concern    Not on file   Social History Narrative    Not on file      Social Determinants of Health      Financial Resource Strain: Not on file   Food Insecurity: Not on file   Transportation Needs: Not on file   Physical Activity: Not on file   Stress: Not on file   Social Connections: Not on file   Intimate Partner Violence: Not on file   Housing Stability: Not on file               Family History:    Family History             Problem Relation Age of Onset    No Known Problems Mother      No Known Problems Father              REVIEW OF SYSTEMS:  CONSTITUTIONAL:  negative  HEENT:  negative  RESPIRATORY:  negative  CARDIOVASCULAR:  negative  GASTROINTESTINAL:  negative   GENITOURINARY:  negative  HEMATOLOGIC/LYMPHATIC:  negative  NEUROLOGICAL:  Negative  * All other ROS reviewed and negative.         PHYSICAL EXAM:  VITALS:  /68 (Site: Left Upper Arm, Position: Sitting, Cuff Size: Medium Adult)   Ht 1.753 m (5' 9.02\")   Wt 84.2 kg (185 lb 9.6 oz)   BMI 27.40 kg/m²   24HR INTAKE/OUTPUT:    [unfilled]  [unfilled]        CONSTITUTIONAL:  alert, no apparent distress and normal weight  EYES:  PERRL, sclera clear  ENT:  Normocephalic,atraumatic, without obvious abnormality  NECK:  supple, symmetrical, trachea midline  LUNGS: Resp effort easy and unlabored, no crackles or wheezing  CARDIOVASCULAR:  NO JVD, regular rate   ABDOMEN:  , normal bowel sounds, soft, non-distended, non-tender,   MUSCULOSKELETAL: No clubbing or cyanosis, 0+ pitting edema lower extremities  NEUROLOGIC:  Mental Status Exam:  Level of Alertness:   awake  PSYCHIATRIC:   person, place, time  SKIN:  2-3 cm anterior left forearm mobile lipoma

## 2024-06-24 ENCOUNTER — HOSPITAL ENCOUNTER (OUTPATIENT)
Dept: ULTRASOUND IMAGING | Age: 44
Discharge: HOME | End: 2024-06-24
Payer: COMMERCIAL

## 2024-06-24 DIAGNOSIS — B19.10 ANICTERIC TYPE B VIRAL HEPATITIS: ICD-10-CM

## 2024-06-24 PROCEDURE — 76705 ECHO EXAM OF ABDOMEN: CPT

## 2025-01-29 ENCOUNTER — TRANSCRIBE ORDERS (OUTPATIENT)
Dept: ADMINISTRATIVE | Age: 45
End: 2025-01-29

## 2025-01-29 DIAGNOSIS — R76.8 HEPATITIS B SURFACE ANTIGEN POSITIVE: Primary | ICD-10-CM

## 2025-02-08 ENCOUNTER — HOSPITAL ENCOUNTER (OUTPATIENT)
Dept: ULTRASOUND IMAGING | Age: 45
Discharge: HOME | End: 2025-02-08
Payer: COMMERCIAL

## 2025-02-08 DIAGNOSIS — R76.8 HEPATITIS B SURFACE ANTIGEN POSITIVE: ICD-10-CM

## 2025-02-08 PROCEDURE — 76705 ECHO EXAM OF ABDOMEN: CPT

## 2025-04-27 ENCOUNTER — HOSPITAL ENCOUNTER (EMERGENCY)
Age: 45
Discharge: HOME | End: 2025-04-27
Payer: COMMERCIAL

## 2025-04-27 ENCOUNTER — APPOINTMENT (OUTPATIENT)
Dept: CT IMAGING | Age: 45
End: 2025-04-27
Payer: COMMERCIAL

## 2025-04-27 VITALS
SYSTOLIC BLOOD PRESSURE: 143 MMHG | RESPIRATION RATE: 16 BRPM | TEMPERATURE: 98 F | HEART RATE: 62 BPM | OXYGEN SATURATION: 97 % | DIASTOLIC BLOOD PRESSURE: 117 MMHG

## 2025-04-27 DIAGNOSIS — N20.1 URETEROLITHIASIS: ICD-10-CM

## 2025-04-27 DIAGNOSIS — R10.9 LEFT FLANK PAIN: Primary | ICD-10-CM

## 2025-04-27 LAB
ALBUMIN SERPL-MCNC: 3.6 G/DL (ref 3.4–5)
ALBUMIN/GLOB SERPL: 1.3 {RATIO} (ref 1.1–2.2)
ALP SERPL-CCNC: 131 U/L (ref 40–129)
ALT SERPL-CCNC: 13 U/L (ref 10–40)
ANION GAP SERPL CALCULATED.3IONS-SCNC: 10 MMOL/L (ref 3–16)
AST SERPL-CCNC: 26 U/L (ref 15–37)
BACTERIA URNS QL MICRO: ABNORMAL /HPF
BILIRUB SERPL-MCNC: 0.3 MG/DL (ref 0–1)
BILIRUB UR QL STRIP.AUTO: NEGATIVE
BUN SERPL-MCNC: 16 MG/DL (ref 7–20)
CALCIUM SERPL-MCNC: 9.1 MG/DL (ref 8.3–10.6)
CHLORIDE SERPL-SCNC: 101 MMOL/L (ref 99–110)
CLARITY UR: ABNORMAL
CO2 SERPL-SCNC: 26 MMOL/L (ref 21–32)
COLOR UR: ABNORMAL
CREAT SERPL-MCNC: 0.9 MG/DL (ref 0.9–1.3)
GFR SERPLBLD CREATININE-BSD FMLA CKD-EPI: >90 ML/MIN/{1.73_M2}
GLUCOSE SERPL-MCNC: 109 MG/DL (ref 70–99)
GLUCOSE UR STRIP.AUTO-MCNC: NEGATIVE MG/DL
HGB UR QL STRIP.AUTO: ABNORMAL
KETONES UR STRIP.AUTO-MCNC: ABNORMAL MG/DL
LEUKOCYTE ESTERASE UR QL STRIP.AUTO: NEGATIVE
MAGNESIUM SERPL-MCNC: 1.72 MG/DL (ref 1.8–2.4)
NITRITE UR QL STRIP.AUTO: NEGATIVE
PH UR STRIP.AUTO: 7 [PH] (ref 5–8)
POTASSIUM SERPL-SCNC: 3.4 MMOL/L (ref 3.5–5.1)
PROT SERPL-MCNC: 6.4 G/DL (ref 6.4–8.2)
PROT UR STRIP.AUTO-MCNC: 30 MG/DL
RBC #/AREA URNS HPF: >100 /HPF (ref 0–4)
SODIUM SERPL-SCNC: 137 MMOL/L (ref 136–145)
SP GR UR STRIP.AUTO: 1.02 (ref 1–1.03)
UA COMPLETE W REFLEX CULTURE PNL UR: ABNORMAL
UA DIPSTICK W REFLEX MICRO PNL UR: YES
URN SPEC COLLECT METH UR: ABNORMAL
UROBILINOGEN UR STRIP-ACNC: 1 E.U./DL
WBC #/AREA URNS HPF: ABNORMAL /HPF (ref 0–5)

## 2025-04-27 PROCEDURE — 83735 ASSAY OF MAGNESIUM: CPT

## 2025-04-27 PROCEDURE — 99284 EMERGENCY DEPT VISIT MOD MDM: CPT

## 2025-04-27 PROCEDURE — 6360000002 HC RX W HCPCS: Performed by: PHYSICIAN ASSISTANT

## 2025-04-27 PROCEDURE — 80053 COMPREHEN METABOLIC PANEL: CPT

## 2025-04-27 PROCEDURE — 81001 URINALYSIS AUTO W/SCOPE: CPT

## 2025-04-27 PROCEDURE — 2580000003 HC RX 258: Performed by: PHYSICIAN ASSISTANT

## 2025-04-27 PROCEDURE — 6370000000 HC RX 637 (ALT 250 FOR IP): Performed by: PHYSICIAN ASSISTANT

## 2025-04-27 PROCEDURE — 74176 CT ABD & PELVIS W/O CONTRAST: CPT

## 2025-04-27 RX ORDER — KETOROLAC TROMETHAMINE 15 MG/ML
15 INJECTION, SOLUTION INTRAMUSCULAR; INTRAVENOUS ONCE
Status: COMPLETED | OUTPATIENT
Start: 2025-04-27 | End: 2025-04-27

## 2025-04-27 RX ORDER — ONDANSETRON 4 MG/1
4 TABLET, FILM COATED ORAL EVERY 8 HOURS PRN
Qty: 12 TABLET | Refills: 0 | Status: SHIPPED | OUTPATIENT
Start: 2025-04-27

## 2025-04-27 RX ORDER — TAMSULOSIN HYDROCHLORIDE 0.4 MG/1
0.4 CAPSULE ORAL DAILY
Qty: 30 CAPSULE | Refills: 0 | Status: SHIPPED | OUTPATIENT
Start: 2025-04-27

## 2025-04-27 RX ORDER — ONDANSETRON 2 MG/ML
8 INJECTION INTRAMUSCULAR; INTRAVENOUS ONCE
Status: COMPLETED | OUTPATIENT
Start: 2025-04-27 | End: 2025-04-27

## 2025-04-27 RX ORDER — TAMSULOSIN HYDROCHLORIDE 0.4 MG/1
0.4 CAPSULE ORAL ONCE
Status: COMPLETED | OUTPATIENT
Start: 2025-04-27 | End: 2025-04-27

## 2025-04-27 RX ORDER — OXYCODONE AND ACETAMINOPHEN 5; 325 MG/1; MG/1
1 TABLET ORAL EVERY 6 HOURS PRN
Qty: 12 TABLET | Refills: 0 | Status: SHIPPED | OUTPATIENT
Start: 2025-04-27 | End: 2025-04-30

## 2025-04-27 RX ORDER — METOCLOPRAMIDE HYDROCHLORIDE 5 MG/ML
10 INJECTION INTRAMUSCULAR; INTRAVENOUS ONCE
Status: COMPLETED | OUTPATIENT
Start: 2025-04-27 | End: 2025-04-27

## 2025-04-27 RX ORDER — OXYCODONE AND ACETAMINOPHEN 5; 325 MG/1; MG/1
2 TABLET ORAL ONCE
Refills: 0 | Status: COMPLETED | OUTPATIENT
Start: 2025-04-27 | End: 2025-04-27

## 2025-04-27 RX ORDER — TAMSULOSIN HYDROCHLORIDE 0.4 MG/1
0.4 CAPSULE ORAL DAILY
Qty: 30 CAPSULE | Refills: 0 | Status: SHIPPED | OUTPATIENT
Start: 2025-04-27 | End: 2025-04-27

## 2025-04-27 RX ORDER — 0.9 % SODIUM CHLORIDE 0.9 %
1000 INTRAVENOUS SOLUTION INTRAVENOUS ONCE
Status: COMPLETED | OUTPATIENT
Start: 2025-04-27 | End: 2025-04-27

## 2025-04-27 RX ORDER — ONDANSETRON 4 MG/1
4 TABLET, FILM COATED ORAL EVERY 8 HOURS PRN
Qty: 12 TABLET | Refills: 0 | Status: SHIPPED | OUTPATIENT
Start: 2025-04-27 | End: 2025-04-27

## 2025-04-27 RX ORDER — DIPHENHYDRAMINE HYDROCHLORIDE 50 MG/ML
25 INJECTION, SOLUTION INTRAMUSCULAR; INTRAVENOUS ONCE
Status: COMPLETED | OUTPATIENT
Start: 2025-04-27 | End: 2025-04-27

## 2025-04-27 RX ORDER — OXYCODONE AND ACETAMINOPHEN 5; 325 MG/1; MG/1
1 TABLET ORAL EVERY 6 HOURS PRN
Qty: 12 TABLET | Refills: 0 | Status: SHIPPED | OUTPATIENT
Start: 2025-04-27 | End: 2025-04-27

## 2025-04-27 RX ADMIN — OXYCODONE HYDROCHLORIDE AND ACETAMINOPHEN 2 TABLET: 5; 325 TABLET ORAL at 16:04

## 2025-04-27 RX ADMIN — HYDROMORPHONE HYDROCHLORIDE 1 MG: 1 INJECTION, SOLUTION INTRAMUSCULAR; INTRAVENOUS; SUBCUTANEOUS at 12:17

## 2025-04-27 RX ADMIN — DIPHENHYDRAMINE HYDROCHLORIDE 25 MG: 50 INJECTION INTRAMUSCULAR; INTRAVENOUS at 12:17

## 2025-04-27 RX ADMIN — HYDROMORPHONE HYDROCHLORIDE 1 MG: 1 INJECTION, SOLUTION INTRAMUSCULAR; INTRAVENOUS; SUBCUTANEOUS at 14:29

## 2025-04-27 RX ADMIN — SODIUM CHLORIDE 1000 ML: 0.9 INJECTION, SOLUTION INTRAVENOUS at 10:38

## 2025-04-27 RX ADMIN — KETOROLAC TROMETHAMINE 15 MG: 15 INJECTION, SOLUTION INTRAMUSCULAR; INTRAVENOUS at 10:39

## 2025-04-27 RX ADMIN — METOCLOPRAMIDE 10 MG: 5 INJECTION, SOLUTION INTRAMUSCULAR; INTRAVENOUS at 12:17

## 2025-04-27 RX ADMIN — TAMSULOSIN HYDROCHLORIDE 0.4 MG: 0.4 CAPSULE ORAL at 16:04

## 2025-04-27 RX ADMIN — ONDANSETRON 8 MG: 2 INJECTION, SOLUTION INTRAMUSCULAR; INTRAVENOUS at 11:07

## 2025-04-27 ASSESSMENT — PAIN SCALES - GENERAL
PAINLEVEL_OUTOF10: 8
PAINLEVEL_OUTOF10: 10

## 2025-04-27 ASSESSMENT — PAIN - FUNCTIONAL ASSESSMENT
PAIN_FUNCTIONAL_ASSESSMENT: 0-10
PAIN_FUNCTIONAL_ASSESSMENT: INTOLERABLE, UNABLE TO DO ANY ACTIVE OR PASSIVE ACTIVITIES

## 2025-04-27 ASSESSMENT — PAIN DESCRIPTION - FREQUENCY: FREQUENCY: CONTINUOUS

## 2025-04-27 ASSESSMENT — PAIN DESCRIPTION - LOCATION
LOCATION: FLANK
LOCATION: ABDOMEN
LOCATION: FLANK

## 2025-04-27 ASSESSMENT — PAIN DESCRIPTION - DESCRIPTORS: DESCRIPTORS: SHARP

## 2025-04-27 ASSESSMENT — PAIN DESCRIPTION - ORIENTATION: ORIENTATION: LEFT

## 2025-04-27 ASSESSMENT — PAIN DESCRIPTION - ONSET: ONSET: GRADUAL

## 2025-04-27 ASSESSMENT — PAIN DESCRIPTION - PAIN TYPE: TYPE: ACUTE PAIN

## 2025-04-27 NOTE — ED NOTES
Pt family here to give ride to pt. Pt dc home and understands all dc instructions. Pt educated on use of urine strainer as well.

## 2025-04-27 NOTE — ED PROVIDER NOTES
hydronephrosis and hydroureter secondary to a proximal   left ureteral obstructing calculus measuring 5 mm.   2. Non-obstructing 2 mm midpole right renal calculi.   3. Mild stranding and non-distention of the urinary bladder which can be seen   with cystitis. Correlate with urinalysis.   4. Mild splenomegaly.   5. Moderate stool burden.           No results found.      ED Provider US Interpretation.    No results found.    PROCEDURES   Unless otherwise noted below, none     Procedures      CRITICAL CARE TIME (.cctime)      See interventions in ED course.     EMERGENCY DEPARTMENT COURSE and DIFFERENTIAL DIAGNOSIS/MDM:   Vitals:    Vitals:    04/27/25 1015 04/27/25 1217 04/27/25 1245   BP: (!) 135/101 (!) 143/117    Pulse: 68 62    Resp: 16 16    Temp: 98 °F (36.7 °C)     TempSrc: Oral     SpO2: 100% 99% 97%       [unfilled]    Patient was given the following medications:  Medications   ketorolac (TORADOL) injection 15 mg (15 mg IntraVENous Given 4/27/25 1039)   sodium chloride 0.9 % bolus 1,000 mL (0 mLs IntraVENous Stopped 4/27/25 1200)   ondansetron (ZOFRAN) injection 8 mg (8 mg IntraVENous Given 4/27/25 1107)   HYDROmorphone (DILAUDID) injection 1 mg (1 mg IntraVENous Given 4/27/25 1217)   metoclopramide (REGLAN) injection 10 mg (10 mg IntraVENous Given 4/27/25 1217)   diphenhydrAMINE (BENADRYL) injection 25 mg (25 mg IntraVENous Given 4/27/25 1217)   HYDROmorphone (DILAUDID) injection 1 mg (1 mg IntraVENous Given 4/27/25 1429)   tamsulosin (FLOMAX) capsule 0.4 mg (0.4 mg Oral Given 4/27/25 1604)   oxyCODONE-acetaminophen (PERCOCET) 5-325 MG per tablet 2 tablet (2 tablets Oral Given 4/27/25 1604)             Chronic Conditions affecting care:    has a past medical history of Drug abuse (HCC), Hepatitis B, Hepatitis C, and Kidney stones.    CONSULTS: (Who and What was discussed)  None      Records Reviewed (External, Source and Summary) last CT abdomen pelvis May 2023 revealed a 3 mm renal stone on the left

## 2025-05-09 ENCOUNTER — TRANSCRIBE ORDERS (OUTPATIENT)
Dept: ADMINISTRATIVE | Age: 45
End: 2025-05-09

## 2025-05-09 DIAGNOSIS — R11.0 NAUSEA: Primary | ICD-10-CM

## 2025-05-16 ENCOUNTER — HOSPITAL ENCOUNTER (OUTPATIENT)
Dept: NUCLEAR MEDICINE | Age: 45
Discharge: HOME | End: 2025-05-16
Payer: COMMERCIAL

## 2025-05-16 VITALS — HEIGHT: 70 IN | BODY MASS INDEX: 26.48 KG/M2 | WEIGHT: 185 LBS

## 2025-05-16 DIAGNOSIS — R11.0 NAUSEA: ICD-10-CM

## 2025-05-16 PROCEDURE — 3430000000 HC RX DIAGNOSTIC RADIOPHARMACEUTICAL: Performed by: INTERNAL MEDICINE

## 2025-05-16 PROCEDURE — A9537 TC99M MEBROFENIN: HCPCS | Performed by: INTERNAL MEDICINE

## 2025-05-16 PROCEDURE — 2580000003 HC RX 258: Performed by: INTERNAL MEDICINE

## 2025-05-16 PROCEDURE — 78227 HEPATOBIL SYST IMAGE W/DRUG: CPT

## 2025-05-16 PROCEDURE — 6360000004 HC RX CONTRAST MEDICATION: Performed by: INTERNAL MEDICINE

## 2025-05-16 RX ADMIN — SINCALIDE 1.68 MCG: 5 INJECTION, POWDER, LYOPHILIZED, FOR SOLUTION INTRAVENOUS at 11:12

## 2025-05-16 RX ADMIN — Medication 5.5 MILLICURIE: at 10:10

## 2025-08-29 ENCOUNTER — TRANSCRIBE ORDERS (OUTPATIENT)
Dept: ADMINISTRATIVE | Age: 45
End: 2025-08-29

## 2025-08-29 DIAGNOSIS — R76.8 HEPATITIS B SURFACE ANTIGEN POSITIVE: Primary | ICD-10-CM

## 2025-09-03 ENCOUNTER — HOSPITAL ENCOUNTER (OUTPATIENT)
Dept: ULTRASOUND IMAGING | Age: 45
Discharge: HOME | End: 2025-09-03
Payer: COMMERCIAL

## 2025-09-03 DIAGNOSIS — R76.8 HEPATITIS B SURFACE ANTIGEN POSITIVE: ICD-10-CM

## 2025-09-03 PROCEDURE — 76705 ECHO EXAM OF ABDOMEN: CPT

## 2025-09-03 PROCEDURE — 76981 USE PARENCHYMA: CPT

## (undated) DEVICE — SUTURE MONOCRYL + SZ 4-0 L18IN ABSRB UD L19MM PS-2 3/8 CIR MCP496G

## (undated) DEVICE — MINOR SET UP PACK: Brand: MEDLINE INDUSTRIES, INC.

## (undated) DEVICE — PENCIL SMK EVAC ALL IN 1 DSGN ENH VISIBILITY IMPROVED AIR

## (undated) DEVICE — GOWN,SIRUS,POLYRNF,SETINSLV,L,20/CS: Brand: MEDLINE

## (undated) DEVICE — GLOVE,SURG,SENSICARE,ALOE,LF,PF,7: Brand: MEDLINE

## (undated) DEVICE — LIQUIBAND RAPID ADHESIVE 36/CS 0.8ML: Brand: MEDLINE

## (undated) DEVICE — ELECTRODE,ECG,STRESS,FOAM,3PK: Brand: MEDLINE

## (undated) DEVICE — GLOVE SURG SZ 7 L12IN FNGR THK79MIL GRN LTX FREE

## (undated) DEVICE — SURGICAL PROCEDURE PACK IV U-BAR

## (undated) DEVICE — SOLUTION IV IRRIG 500ML 0.9% SODIUM CHL 2F7123

## (undated) DEVICE — GLOVE SURG SZ 65 L12IN FNGR THK94MIL STD WHT LTX FREE

## (undated) DEVICE — SUTURE VICRYL + SZ 3-0 L27IN ABSRB UD L26MM SH 1/2 CIR VCP416H

## (undated) DEVICE — GLOVE SURG SZ 65 L12IN FNGR THK79MIL GRN LTX FREE

## (undated) DEVICE — SUTURE VICRYL + SZ 3-0 L18IN ABSRB UD SH 1/2 CIR TAPERCUT NDL VCP864D

## (undated) DEVICE — TUBING SUCT 10FR MAL ALUM SHFT FN CAP VENT UNIV CONN W/ OBT

## (undated) DEVICE — APPLICATOR MEDICATED 26 CC SOLUTION HI LT ORNG CHLORAPREP

## (undated) DEVICE — ELECTRODE PT RET AD L9FT HI MOIST COND ADH HYDRGEL CORDED